# Patient Record
Sex: FEMALE | Race: WHITE
[De-identification: names, ages, dates, MRNs, and addresses within clinical notes are randomized per-mention and may not be internally consistent; named-entity substitution may affect disease eponyms.]

---

## 2017-06-28 ENCOUNTER — HOSPITAL ENCOUNTER (EMERGENCY)
Dept: HOSPITAL 10 - FTE | Age: 57
Discharge: HOME | End: 2017-06-28
Payer: COMMERCIAL

## 2017-06-28 VITALS — HEIGHT: 55 IN | BODY MASS INDEX: 31.12 KG/M2 | WEIGHT: 134.48 LBS
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
## 2017-06-28 DIAGNOSIS — S20.211A: Primary | ICD-10-CM

## 2017-06-28 DIAGNOSIS — F17.210: ICD-10-CM

## 2017-06-28 PROCEDURE — 71100 X-RAY EXAM RIBS UNI 2 VIEWS: CPT

## 2017-06-28 NOTE — ERD
ER Documentation


Chief Complaint


Date/Time


DATE: 6/28/17 


TIME: 13:39


Chief Complaint


sent by clinic for r. rib and r. shoulder pain s/p fall 1 wk ago





HPI


This is a [age]-year-old female who presents to the emergency room for 

evaluation of right-sided rib pain after she fell off a chair one week ago.  

The patient denies any head injury or loss of consciousness and does state that 

she is having pain in the right portion of her ribs.  She states it hurts when 

she takes a deep breath and when she moves her right upper extremity.  The 

patient denies any radiation of the pain.  She was seen at a clinic and was 

sent to the emergency room for evaluation.





ROS


All systems reviewed and are negative except as per history of present illness.





Medications


Home Meds


Active Scripts


Meloxicam* (Mobic*) 15 Mg Tablet, 15 MG PO DAILY, #30 TAB


   Prov:GAVIN VALDERRAMA PA-C         5/23/17


Reported Medications


[lamotrigine]   No Conflict Check


   7/19/16


[atarax]   No Conflict Check


   7/19/16


Lovastatin (Lovastatin) 40 Mg Tablet, 40 MG PO DAILY, TAB


   7/19/16


[levothyroxine]   No Conflict Check


   7/19/16


Alprazolam* (Xanax*) 2 Mg Tablet, 2 MG PO Q8H Y for ANXIETY, TAB


   7/19/16


Primidone* (Mysoline*) 250 Mg Tablet, 250 MG PO BID


   4/2/12





Allergies


Allergies:  


Coded Allergies:  


     Penicillins (Verified  Allergy, Unknown, RASH, 7/19/16)





PMhx/Soc


History of Surgery:  Yes (toyin ctr, [facility] [place] arthroscopy)


Anesthesia Reaction:  No


Hx Neurological Disorder:  Yes (seizures last sz over 10 years ago)


Hx Respiratory Disorders:  No


Hx Cardiac Disorders:  No


Hx Psychiatric Problems:  Yes (anxeity, depression)


Hx Miscellaneous Medical Probl:  Yes (DIFFICULTY SLEEPING, CARPAL TUNNER 

SYNDROME )


Hx Alcohol Use:  No


Hx Substance Use:  No


Hx Tobacco Use:  Yes


Smoking Status:  Current every day smoker





Physical Exam


Vitals





Vital Signs








  Date Time  Temp Pulse Resp B/P Pulse Ox O2 Delivery O2 Flow Rate FiO2


 


6/28/17 11:40 98.5 74 20 148/72 99   








Physical Exam


INITIAL VITAL SIGNS: Reviewed by me


GENERAL:  The patient is well developed and appropriate for usual state of 

health in no apparent distress


HEENT: Pupils equal, round, and reactive to light.  EOMI. There is no scleral 

icterus.


NECK:  C-spine is soft and supple, there is no meningismus.  There is no 

cervical lymphadenopathy.


LUNGS:  Clear to auscultation bilaterally. There are no rales, wheezes or 

rhonchi.


HEART:  Regular rate and rhythm, no murmurs, clicks, rubs or gallops.


ABDOMEN:  Soft, non-tender, non-distended.  There are bowel sounds in all four 

quadrants. No rebound or guarding.


EXTREMITIES:  There is no peripheral cyanosis or edema.  No focal swelling or 

erythema.


NEUROLOGICAL:  The patient moves all four extremities with 5/5 strength.  

Cranial nerves II - XII are intact. Normal gait. Alert and oriented


SKIN:  There is no apparent rash or petechiae.


Musculoskeletal: Tender to palpation of the right lateral chest wall, no 

paradoxical chest wall movement


HEME/LYMPHATIC:  There is no evidence of excessive bruising or lymphedema.


PSYCHIATRIC:  The patient does not appear anxious or depressed.





Procedures/MDM


X-ray Ribs 2V Interpreted by me:


Soft Tissue:                                               No acute 

abnormalities


Bones:                                                    No acute abnormalities


Mediastinum/Cardiac Silhouette/Lungs:     [No acute abnormalities]








This 56-year-old female presents to the ER for evaluation of right-sided rib 

pain after a fall from a chair.  She was sent on my examination.  No 

paradoxical chest wall movement.  X-rays were obtained which did not reveal any 

fractures.  The patient likely has a right-sided rib contusion.  The patient is 

seeing a pain management doctor and is on multiple pain medicines.  I did 

advise her to take a deep breaths and not to avoid developing pneumonia.  She 

verbalized understanding.  She will be discharged at this time into the care of 

her primary care physician.





Smoking Cessation Therapy:  Pt. was lectured for greater than  3 minutes on the 

health risks of continued smoking and the benefits of cessation.





Departure


Diagnosis:  


 Primary Impression:  


 Contusion of rib on right side


Condition:  Stable











BRISEIDA SANTANA DO Jun 28, 2017 13:42

## 2017-06-28 NOTE — RADRPT
PROCEDURE:   XR Ribs. 

 

CLINICAL INDICATION:   Chest pain  

 

TECHNIQUE:   Multiple oblique views of the right ribs, as well as a frontal chest, were obtained.  T
he images were reviewed on a PACS workstation. 

 

COMPARISON:   August 24, 2008 

 

FINDINGS:

There is no evidence for a rib fracture. The underlying lung parenchyma is intact without evidence f
or pneumothorax. 

The heart size is normal.  Lungs are clear.  The remainder of the osseous structures are unremarkabl
e.

There is no interval change.

 

IMPRESSION:

No acute rib fracture identified. Negative exam.  No interval change.

 

RPTAT: EE

_____________________________________________ 

.Sarah Guerrero MD, MD           Date    Time 

Electronically viewed and signed by .Sarah Guerrero MD, MD on 06/28/2017 13:37 

 

D:  06/28/2017 13:37  T:  06/28/2017 13:37

.F/

## 2018-02-10 ENCOUNTER — HOSPITAL ENCOUNTER (INPATIENT)
Age: 58
LOS: 8 days | Discharge: HOME | DRG: 102 | End: 2018-02-18

## 2018-02-10 ENCOUNTER — HOSPITAL ENCOUNTER (INPATIENT)
Dept: HOSPITAL 91 - MS4 | Age: 58
LOS: 8 days | Discharge: HOME | DRG: 102 | End: 2018-02-18
Payer: COMMERCIAL

## 2018-02-10 DIAGNOSIS — F17.210: ICD-10-CM

## 2018-02-10 DIAGNOSIS — E78.5: ICD-10-CM

## 2018-02-10 DIAGNOSIS — B95.5: ICD-10-CM

## 2018-02-10 DIAGNOSIS — G40.909: ICD-10-CM

## 2018-02-10 DIAGNOSIS — E03.9: ICD-10-CM

## 2018-02-10 DIAGNOSIS — R78.81: ICD-10-CM

## 2018-02-10 DIAGNOSIS — R55: ICD-10-CM

## 2018-02-10 DIAGNOSIS — Z91.81: ICD-10-CM

## 2018-02-10 DIAGNOSIS — F41.9: ICD-10-CM

## 2018-02-10 DIAGNOSIS — F32.9: ICD-10-CM

## 2018-02-10 DIAGNOSIS — G43.909: Primary | ICD-10-CM

## 2018-02-10 DIAGNOSIS — G20: ICD-10-CM

## 2018-02-10 DIAGNOSIS — I10: ICD-10-CM

## 2018-02-10 DIAGNOSIS — G93.40: ICD-10-CM

## 2018-02-10 LAB
ADD MAN DIFF?: NO
ADD UMIC: YES
ALANINE AMINOTRANSFERASE: 26 IU/L (ref 13–69)
ALBUMIN/GLOBULIN RATIO: 1.59
ALBUMIN: 4.3 G/DL (ref 3.3–4.9)
ALKALINE PHOSPHATASE: 87 IU/L (ref 42–121)
ANION GAP: 12 (ref 8–16)
ASPARTATE AMINO TRANSFERASE: 29 IU/L (ref 15–46)
BASOPHIL #: 0.1 10^3/UL (ref 0–0.1)
BASOPHILS %: 1 % (ref 0–2)
BILIRUBIN,DIRECT: 0 MG/DL (ref 0–0.2)
BILIRUBIN,TOTAL: 0 MG/DL (ref 0.2–1.3)
BLOOD UREA NITROGEN: 16 MG/DL (ref 7–20)
CALCIUM: 9.3 MG/DL (ref 8.4–10.2)
CARBON DIOXIDE: 24 MMOL/L (ref 21–31)
CHLORIDE: 102 MMOL/L (ref 97–110)
CK INDEX: 1
CK-MB: 0.96 NG/ML (ref 0–2.4)
CREATINE KINASE: 101 IU/L (ref 23–200)
CREATININE: 0.84 MG/DL (ref 0.44–1)
EOSINOPHILS #: 0.1 10^3/UL (ref 0–0.5)
EOSINOPHILS %: 2.3 % (ref 0–7)
FREE THYROXINE INDEX (CALC): 3.13 UG/ML (ref 0.65–3.89)
GLOBULIN: 2.7 G/DL (ref 1.3–3.2)
GLUCOSE: 82 MG/DL (ref 70–220)
HEMATOCRIT: 32.6 % (ref 37–47)
HEMOGLOBIN: 11.4 G/DL (ref 12–16)
INR: 0.91
LYMPHOCYTES #: 2.6 10^3/UL (ref 0.8–2.9)
LYMPHOCYTES %: 50.3 % (ref 15–51)
MEAN CORPUSCULAR HEMOGLOBIN: 30.9 PG (ref 29–33)
MEAN CORPUSCULAR HGB CONC: 35 G/DL (ref 32–37)
MEAN CORPUSCULAR VOLUME: 88.3 FL (ref 82–101)
MEAN PLATELET VOLUME: 10 FL (ref 7.4–10.4)
MONOCYTE #: 0.5 10^3/UL (ref 0.3–0.9)
MONOCYTES %: 9.4 % (ref 0–11)
NEUTROPHIL #: 1.9 10^3/UL (ref 1.6–7.5)
NEUTROPHILS %: 36.6 % (ref 39–77)
NUCLEATED RED BLOOD CELLS #: 0 10^3/UL (ref 0–0)
NUCLEATED RED BLOOD CELLS%: 0 /100WBC (ref 0–0)
PARTIAL THROMBOPLASTIN TIME: 27.3 SEC (ref 25–35)
PLATELET COUNT: 240 10^3/UL (ref 140–415)
POTASSIUM: 4.7 MMOL/L (ref 3.5–5.1)
PROTIME: 12.3 SEC (ref 11.9–14.9)
PT RATIO: 1
RED BLOOD COUNT: 3.69 10^6/UL (ref 4.2–5.4)
RED CELL DISTRIBUTION WIDTH: 14.3 % (ref 11.5–14.5)
SODIUM: 133 MMOL/L (ref 135–144)
T3 UPTAKE: 33.7 % (ref 23.5–40.5)
T4 (THYROXINE): 9.3 UG/DL (ref 5.5–11)
TOTAL PROTEIN: 7 G/DL (ref 6.1–8.1)
TROPONIN-I: < 0.012 NG/ML (ref 0–0.12)
UR ASCORBIC ACID: NEGATIVE MG/DL
UR BILIRUBIN (DIP): NEGATIVE MG/DL
UR BLOOD (DIP): (no result) MG/DL
UR CLARITY: CLEAR
UR COLOR: YELLOW
UR GLUCOSE (DIP): NEGATIVE MG/DL
UR KETONES (DIP): NEGATIVE MG/DL
UR LEUKOCYTE ESTERASE (DIP): NEGATIVE LEU/UL
UR NITRITE (DIP): NEGATIVE MG/DL
UR PH (DIP): 6 (ref 5–9)
UR RBC: 12 /HPF (ref 0–5)
UR SPECIFIC GRAVITY (DIP): 1.02 (ref 1–1.03)
UR TOTAL PROTEIN (DIP): NEGATIVE MG/DL
UR UROBILINOGEN (DIP): NEGATIVE MG/DL
UR WBC: 0 /HPF (ref 0–5)
WHITE BLOOD COUNT: 5.1 10^3/UL (ref 4.8–10.8)

## 2018-02-10 PROCEDURE — 70553 MRI BRAIN STEM W/O & W/DYE: CPT

## 2018-02-10 PROCEDURE — 85730 THROMBOPLASTIN TIME PARTIAL: CPT

## 2018-02-10 PROCEDURE — 85025 COMPLETE CBC W/AUTO DIFF WBC: CPT

## 2018-02-10 PROCEDURE — 84479 ASSAY OF THYROID (T3 OR T4): CPT

## 2018-02-10 PROCEDURE — 93005 ELECTROCARDIOGRAM TRACING: CPT

## 2018-02-10 PROCEDURE — 82550 ASSAY OF CK (CPK): CPT

## 2018-02-10 PROCEDURE — 80069 RENAL FUNCTION PANEL: CPT

## 2018-02-10 PROCEDURE — 36569 INSJ PICC 5 YR+ W/O IMAGING: CPT

## 2018-02-10 PROCEDURE — 83735 ASSAY OF MAGNESIUM: CPT

## 2018-02-10 PROCEDURE — 80202 ASSAY OF VANCOMYCIN: CPT

## 2018-02-10 PROCEDURE — 80306 DRUG TEST PRSMV INSTRMNT: CPT

## 2018-02-10 PROCEDURE — 80061 LIPID PANEL: CPT

## 2018-02-10 PROCEDURE — 97167 OT EVAL HIGH COMPLEX 60 MIN: CPT

## 2018-02-10 PROCEDURE — 76937 US GUIDE VASCULAR ACCESS: CPT

## 2018-02-10 PROCEDURE — 84132 ASSAY OF SERUM POTASSIUM: CPT

## 2018-02-10 PROCEDURE — 97163 PT EVAL HIGH COMPLEX 45 MIN: CPT

## 2018-02-10 PROCEDURE — 83036 HEMOGLOBIN GLYCOSYLATED A1C: CPT

## 2018-02-10 PROCEDURE — 80048 BASIC METABOLIC PNL TOTAL CA: CPT

## 2018-02-10 PROCEDURE — 84443 ASSAY THYROID STIM HORMONE: CPT

## 2018-02-10 PROCEDURE — 85610 PROTHROMBIN TIME: CPT

## 2018-02-10 PROCEDURE — 82553 CREATINE MB FRACTION: CPT

## 2018-02-10 PROCEDURE — 84436 ASSAY OF TOTAL THYROXINE: CPT

## 2018-02-10 PROCEDURE — 71045 X-RAY EXAM CHEST 1 VIEW: CPT

## 2018-02-10 PROCEDURE — 80053 COMPREHEN METABOLIC PANEL: CPT

## 2018-02-10 PROCEDURE — 70544 MR ANGIOGRAPHY HEAD W/O DYE: CPT

## 2018-02-10 PROCEDURE — 87040 BLOOD CULTURE FOR BACTERIA: CPT

## 2018-02-10 PROCEDURE — 84100 ASSAY OF PHOSPHORUS: CPT

## 2018-02-10 PROCEDURE — 95819 EEG AWAKE AND ASLEEP: CPT

## 2018-02-10 PROCEDURE — 80307 DRUG TEST PRSMV CHEM ANLYZR: CPT

## 2018-02-10 PROCEDURE — 82607 VITAMIN B-12: CPT

## 2018-02-10 PROCEDURE — 70549 MR ANGIOGRAPH NECK W/O&W/DYE: CPT

## 2018-02-10 PROCEDURE — 87086 URINE CULTURE/COLONY COUNT: CPT

## 2018-02-10 PROCEDURE — 72156 MRI NECK SPINE W/O & W/DYE: CPT

## 2018-02-10 PROCEDURE — 93306 TTE W/DOPPLER COMPLETE: CPT

## 2018-02-10 PROCEDURE — 82746 ASSAY OF FOLIC ACID SERUM: CPT

## 2018-02-10 PROCEDURE — 70450 CT HEAD/BRAIN W/O DYE: CPT

## 2018-02-10 PROCEDURE — 81001 URINALYSIS AUTO W/SCOPE: CPT

## 2018-02-10 PROCEDURE — 99285 EMERGENCY DEPT VISIT HI MDM: CPT

## 2018-02-10 PROCEDURE — 84484 ASSAY OF TROPONIN QUANT: CPT

## 2018-02-10 PROCEDURE — 84425 ASSAY OF VITAMIN B-1: CPT

## 2018-02-10 RX ADMIN — THIAMINE HYDROCHLORIDE 1 MLS/HR: 100 INJECTION, SOLUTION INTRAMUSCULAR; INTRAVENOUS at 22:39

## 2018-02-11 LAB
ACETAMINOPHEN: < 10 UG/ML (ref 10–30)
ADD MAN DIFF?: NO
ALANINE AMINOTRANSFERASE: 30 IU/L (ref 13–69)
ALBUMIN/GLOBULIN RATIO: 1.64
ALBUMIN: 4.1 G/DL (ref 3.3–4.9)
ALKALINE PHOSPHATASE: 78 IU/L (ref 42–121)
ANION GAP: 14 (ref 8–16)
ASPARTATE AMINO TRANSFERASE: 27 IU/L (ref 15–46)
BASOPHIL #: 0.1 10^3/UL (ref 0–0.1)
BASOPHILS %: 0.9 % (ref 0–2)
BILIRUBIN,DIRECT: 0 MG/DL (ref 0–0.2)
BILIRUBIN,TOTAL: 0.1 MG/DL (ref 0.2–1.3)
BLOOD UREA NITROGEN: 11 MG/DL (ref 7–20)
CALCIUM: 8.8 MG/DL (ref 8.4–10.2)
CARBON DIOXIDE: 24 MMOL/L (ref 21–31)
CHLORIDE: 107 MMOL/L (ref 97–110)
CHOL/HDL RATIO: 3 RATIO
CHOLESTEROL: 204 MG/DL (ref 100–200)
CREATININE: 0.76 MG/DL (ref 0.44–1)
EOSINOPHILS #: 0.1 10^3/UL (ref 0–0.5)
EOSINOPHILS %: 2.1 % (ref 0–7)
ETHANOL: < 10 MG/DL
FOLATE: 4.2 NG/ML (ref 2.8–20)
GLOBULIN: 2.5 G/DL (ref 1.3–3.2)
GLUCOSE: 81 MG/DL (ref 70–220)
HDL CHOLESTEROL: 67 MG/DL (ref 37–92)
HEMATOCRIT: 32.4 % (ref 37–47)
HEMOGLOBIN A1C: 5.2 % (ref 0–5.9)
HEMOGLOBIN: 11.2 G/DL (ref 12–16)
LDL CHOLESTEROL,CALCULATED: 120 MG/DL
LYMPHOCYTES #: 2.2 10^3/UL (ref 0.8–2.9)
LYMPHOCYTES %: 38.9 % (ref 15–51)
MAGNESIUM: 1.7 MG/DL (ref 1.7–2.5)
MEAN CORPUSCULAR HEMOGLOBIN: 30.7 PG (ref 29–33)
MEAN CORPUSCULAR HGB CONC: 34.6 G/DL (ref 32–37)
MEAN CORPUSCULAR VOLUME: 88.8 FL (ref 82–101)
MEAN PLATELET VOLUME: 9.9 FL (ref 7.4–10.4)
MONOCYTE #: 0.5 10^3/UL (ref 0.3–0.9)
MONOCYTES %: 9 % (ref 0–11)
NEUTROPHIL #: 2.8 10^3/UL (ref 1.6–7.5)
NEUTROPHILS %: 48.7 % (ref 39–77)
NUCLEATED RED BLOOD CELLS #: 0 10^3/UL (ref 0–0)
NUCLEATED RED BLOOD CELLS%: 0 /100WBC (ref 0–0)
PLATELET COUNT: 229 10^3/UL (ref 140–415)
POTASSIUM: 4.3 MMOL/L (ref 3.5–5.1)
RED BLOOD COUNT: 3.65 10^6/UL (ref 4.2–5.4)
RED CELL DISTRIBUTION WIDTH: 14.5 % (ref 11.5–14.5)
SALICYLATE: < 1 MG/DL (ref 5–30)
SODIUM: 141 MMOL/L (ref 135–144)
THYROID STIMULATING HORMONE: 0.93 MIU/L (ref 0.47–4.68)
TOTAL PROTEIN: 6.6 G/DL (ref 6.1–8.1)
TRIGLYCERIDES: 84 MG/DL (ref 0–149)
VITAMIN B12: 793 PG/ML (ref 239–931)
WHITE BLOOD COUNT: 5.7 10^3/UL (ref 4.8–10.8)

## 2018-02-11 RX ADMIN — MAGNESIUM SULFATE HEPTAHYDRATE 1 MLS/HR: 40 INJECTION, SOLUTION INTRAVENOUS at 14:53

## 2018-02-11 RX ADMIN — KETOROLAC TROMETHAMINE 1 MG: 30 INJECTION, SOLUTION INTRAMUSCULAR at 18:20

## 2018-02-11 RX ADMIN — SUMATRIPTAN SUCCINATE 1 MG: 6 INJECTION SUBCUTANEOUS at 10:11

## 2018-02-11 RX ADMIN — PRIMIDONE 1 MG: 250 TABLET ORAL at 10:11

## 2018-02-11 RX ADMIN — PROCHLORPERAZINE MALEATE 1 MG: 10 TABLET, FILM COATED ORAL at 15:51

## 2018-02-11 RX ADMIN — ALPRAZOLAM 1 MG: 1 TABLET ORAL at 21:39

## 2018-02-11 RX ADMIN — DIPHENHYDRAMINE HYDROCHLORIDE 1 MG: 50 INJECTION, SOLUTION INTRAMUSCULAR; INTRAVENOUS at 15:51

## 2018-02-11 RX ADMIN — PRIMIDONE 1 MG: 250 TABLET ORAL at 20:55

## 2018-02-12 LAB
ADD MAN DIFF?: NO
ALBUMIN: 3.7 G/DL (ref 3.3–4.9)
ANION GAP: 10 (ref 8–16)
BASOPHIL #: 0.1 10^3/UL (ref 0–0.1)
BASOPHILS %: 1 % (ref 0–2)
BLOOD UREA NITROGEN: 13 MG/DL (ref 7–20)
CALCIUM: 9.1 MG/DL (ref 8.4–10.2)
CARBON DIOXIDE: 24 MMOL/L (ref 21–31)
CHLORIDE: 106 MMOL/L (ref 97–110)
CREATININE: 0.8 MG/DL (ref 0.44–1)
EOSINOPHILS #: 0.1 10^3/UL (ref 0–0.5)
EOSINOPHILS %: 2.8 % (ref 0–7)
GLUCOSE: 89 MG/DL (ref 70–220)
HEMATOCRIT: 32.9 % (ref 37–47)
HEMOGLOBIN: 11.3 G/DL (ref 12–16)
LYMPHOCYTES #: 2.4 10^3/UL (ref 0.8–2.9)
LYMPHOCYTES %: 48.3 % (ref 15–51)
MAGNESIUM: 1.8 MG/DL (ref 1.7–2.5)
MEAN CORPUSCULAR HEMOGLOBIN: 30.5 PG (ref 29–33)
MEAN CORPUSCULAR HGB CONC: 34.3 G/DL (ref 32–37)
MEAN CORPUSCULAR VOLUME: 88.7 FL (ref 82–101)
MEAN PLATELET VOLUME: 10.1 FL (ref 7.4–10.4)
MONOCYTE #: 0.6 10^3/UL (ref 0.3–0.9)
MONOCYTES %: 11.4 % (ref 0–11)
NEUTROPHIL #: 1.8 10^3/UL (ref 1.6–7.5)
NEUTROPHILS %: 36.3 % (ref 39–77)
NUCLEATED RED BLOOD CELLS #: 0 10^3/UL (ref 0–0)
NUCLEATED RED BLOOD CELLS%: 0 /100WBC (ref 0–0)
PHOSPHORUS: 3.6 MG/DL (ref 2.5–4.9)
PLATELET COUNT: 209 10^3/UL (ref 140–415)
POTASSIUM: 4.5 MMOL/L (ref 3.5–5.1)
POTASSIUM: 5.3 MMOL/L (ref 3.5–5.1)
RED BLOOD COUNT: 3.71 10^6/UL (ref 4.2–5.4)
RED CELL DISTRIBUTION WIDTH: 14.5 % (ref 11.5–14.5)
SODIUM: 135 MMOL/L (ref 135–144)
WHITE BLOOD COUNT: 5 10^3/UL (ref 4.8–10.8)

## 2018-02-12 RX ADMIN — ALPRAZOLAM 1 MG: 1 TABLET ORAL at 13:27

## 2018-02-12 RX ADMIN — KETOROLAC TROMETHAMINE 1 MG: 30 INJECTION, SOLUTION INTRAMUSCULAR at 08:21

## 2018-02-12 RX ADMIN — PRIMIDONE 1 MG: 250 TABLET ORAL at 08:21

## 2018-02-12 RX ADMIN — TOPIRAMATE 1 MG: 25 TABLET, FILM COATED ORAL at 21:37

## 2018-02-12 RX ADMIN — KETOROLAC TROMETHAMINE 1 MG: 30 INJECTION, SOLUTION INTRAMUSCULAR at 19:08

## 2018-02-12 RX ADMIN — TOPIRAMATE 1 MG: 25 TABLET, FILM COATED ORAL at 13:27

## 2018-02-12 RX ADMIN — PRIMIDONE 1 MG: 250 TABLET ORAL at 21:37

## 2018-02-12 RX ADMIN — LORAZEPAM 1 MG: 2 INJECTION, SOLUTION INTRAMUSCULAR; INTRAVENOUS at 16:13

## 2018-02-12 RX ADMIN — VANCOMYCIN HYDROCHLORIDE 1 MLS/HR: 1 INJECTION, POWDER, LYOPHILIZED, FOR SOLUTION INTRAVENOUS at 13:28

## 2018-02-12 RX ADMIN — ALPRAZOLAM 1 MG: 1 TABLET ORAL at 21:50

## 2018-02-13 LAB
ADD MAN DIFF?: NO
ANION GAP: 10 (ref 8–16)
BASOPHIL #: 0.1 10^3/UL (ref 0–0.1)
BASOPHILS %: 1.2 % (ref 0–2)
BLOOD UREA NITROGEN: 9 MG/DL (ref 7–20)
CALCIUM: 8.6 MG/DL (ref 8.4–10.2)
CARBON DIOXIDE: 27 MMOL/L (ref 21–31)
CHLORIDE: 106 MMOL/L (ref 97–110)
CREATININE: 0.83 MG/DL (ref 0.44–1)
EOSINOPHILS #: 0.2 10^3/UL (ref 0–0.5)
EOSINOPHILS %: 3.1 % (ref 0–7)
GLUCOSE: 88 MG/DL (ref 70–220)
HEMATOCRIT: 31.6 % (ref 37–47)
HEMOGLOBIN: 10.9 G/DL (ref 12–16)
LYMPHOCYTES #: 2.2 10^3/UL (ref 0.8–2.9)
LYMPHOCYTES %: 42.3 % (ref 15–51)
MAGNESIUM: 1.6 MG/DL (ref 1.7–2.5)
MEAN CORPUSCULAR HEMOGLOBIN: 30.7 PG (ref 29–33)
MEAN CORPUSCULAR HGB CONC: 34.5 G/DL (ref 32–37)
MEAN CORPUSCULAR VOLUME: 89 FL (ref 82–101)
MEAN PLATELET VOLUME: 10.2 FL (ref 7.4–10.4)
MONOCYTE #: 0.5 10^3/UL (ref 0.3–0.9)
MONOCYTES %: 10.5 % (ref 0–11)
NEUTROPHIL #: 2.2 10^3/UL (ref 1.6–7.5)
NEUTROPHILS %: 42.7 % (ref 39–77)
NUCLEATED RED BLOOD CELLS #: 0 10^3/UL (ref 0–0)
NUCLEATED RED BLOOD CELLS%: 0 /100WBC (ref 0–0)
PHOSPHORUS: 4.1 MG/DL (ref 2.5–4.9)
PLATELET COUNT: 214 10^3/UL (ref 140–415)
POTASSIUM: 5.2 MMOL/L (ref 3.5–5.1)
RED BLOOD COUNT: 3.55 10^6/UL (ref 4.2–5.4)
RED CELL DISTRIBUTION WIDTH: 14.5 % (ref 11.5–14.5)
SODIUM: 138 MMOL/L (ref 135–144)
VANCOMYCIN,TROUGH: 14.3 UG/ML (ref 10–20)
WHITE BLOOD COUNT: 5.1 10^3/UL (ref 4.8–10.8)

## 2018-02-13 RX ADMIN — KETOROLAC TROMETHAMINE 1 MG: 30 INJECTION, SOLUTION INTRAMUSCULAR at 05:43

## 2018-02-13 RX ADMIN — TOPIRAMATE 1 MG: 25 TABLET, FILM COATED ORAL at 09:06

## 2018-02-13 RX ADMIN — KETOROLAC TROMETHAMINE 1 MG: 30 INJECTION, SOLUTION INTRAMUSCULAR at 13:18

## 2018-02-13 RX ADMIN — PRIMIDONE 1 MG: 250 TABLET ORAL at 09:06

## 2018-02-13 RX ADMIN — PRIMIDONE 1 MG: 250 TABLET ORAL at 21:31

## 2018-02-13 RX ADMIN — VANCOMYCIN HYDROCHLORIDE 1 MLS/HR: 500 INJECTION, POWDER, LYOPHILIZED, FOR SOLUTION INTRAVENOUS at 13:17

## 2018-02-13 RX ADMIN — ALPRAZOLAM 1 MG: 1 TABLET ORAL at 21:42

## 2018-02-13 RX ADMIN — ALPRAZOLAM 1 MG: 1 TABLET ORAL at 09:08

## 2018-02-13 RX ADMIN — VANCOMYCIN HYDROCHLORIDE 1 MLS/HR: 500 INJECTION, POWDER, LYOPHILIZED, FOR SOLUTION INTRAVENOUS at 00:26

## 2018-02-14 LAB
ADD MAN DIFF?: NO
ANION GAP: 11 (ref 8–16)
BASOPHIL #: 0 10^3/UL (ref 0–0.1)
BASOPHILS %: 0.9 % (ref 0–2)
BLOOD UREA NITROGEN: 8 MG/DL (ref 7–20)
CALCIUM: 9.2 MG/DL (ref 8.4–10.2)
CARBON DIOXIDE: 24 MMOL/L (ref 21–31)
CHLORIDE: 106 MMOL/L (ref 97–110)
CREATININE: 0.73 MG/DL (ref 0.44–1)
EOSINOPHILS #: 0.2 10^3/UL (ref 0–0.5)
EOSINOPHILS %: 3.4 % (ref 0–7)
GLUCOSE: 89 MG/DL (ref 70–220)
HEMATOCRIT: 32.8 % (ref 37–47)
HEMOGLOBIN: 11.4 G/DL (ref 12–16)
LYMPHOCYTES #: 1.8 10^3/UL (ref 0.8–2.9)
LYMPHOCYTES %: 40.4 % (ref 15–51)
MAGNESIUM: 1.7 MG/DL (ref 1.7–2.5)
MEAN CORPUSCULAR HEMOGLOBIN: 30.3 PG (ref 29–33)
MEAN CORPUSCULAR HGB CONC: 34.8 G/DL (ref 32–37)
MEAN CORPUSCULAR VOLUME: 87.2 FL (ref 82–101)
MEAN PLATELET VOLUME: 10.3 FL (ref 7.4–10.4)
MONOCYTE #: 0.4 10^3/UL (ref 0.3–0.9)
MONOCYTES %: 9.4 % (ref 0–11)
NEUTROPHIL #: 2 10^3/UL (ref 1.6–7.5)
NEUTROPHILS %: 45.7 % (ref 39–77)
NUCLEATED RED BLOOD CELLS #: 0 10^3/UL (ref 0–0)
NUCLEATED RED BLOOD CELLS%: 0 /100WBC (ref 0–0)
PHOSPHORUS: 4.2 MG/DL (ref 2.5–4.9)
PLATELET COUNT: 221 10^3/UL (ref 140–415)
POTASSIUM: 4.2 MMOL/L (ref 3.5–5.1)
RED BLOOD COUNT: 3.76 10^6/UL (ref 4.2–5.4)
RED CELL DISTRIBUTION WIDTH: 14.5 % (ref 11.5–14.5)
SODIUM: 137 MMOL/L (ref 135–144)
WHITE BLOOD COUNT: 4.5 10^3/UL (ref 4.8–10.8)

## 2018-02-14 RX ADMIN — LAMOTRIGINE 1 MG: 100 TABLET ORAL at 10:13

## 2018-02-14 RX ADMIN — PRIMIDONE 1 MG: 250 TABLET ORAL at 21:24

## 2018-02-14 RX ADMIN — PROCHLORPERAZINE MALEATE 1 MG: 10 TABLET, FILM COATED ORAL at 08:36

## 2018-02-14 RX ADMIN — KETOROLAC TROMETHAMINE 1 MG: 30 INJECTION, SOLUTION INTRAMUSCULAR at 10:11

## 2018-02-14 RX ADMIN — ALPRAZOLAM 1 MG: 1 TABLET ORAL at 21:25

## 2018-02-14 RX ADMIN — MORPHINE SULFATE 1 MG: 2 INJECTION, SOLUTION INTRAMUSCULAR; INTRAVENOUS at 17:45

## 2018-02-14 RX ADMIN — PRIMIDONE 1 MG: 250 TABLET ORAL at 08:30

## 2018-02-14 RX ADMIN — ALPRAZOLAM 1 MG: 1 TABLET ORAL at 10:17

## 2018-02-14 RX ADMIN — VANCOMYCIN HYDROCHLORIDE 1 MLS/HR: 500 INJECTION, POWDER, LYOPHILIZED, FOR SOLUTION INTRAVENOUS at 00:52

## 2018-02-14 RX ADMIN — VANCOMYCIN HYDROCHLORIDE 1 MLS/HR: 500 INJECTION, POWDER, LYOPHILIZED, FOR SOLUTION INTRAVENOUS at 12:35

## 2018-02-14 RX ADMIN — MORPHINE SULFATE 1 MG: 2 INJECTION, SOLUTION INTRAMUSCULAR; INTRAVENOUS at 22:51

## 2018-02-14 RX ADMIN — BUTALBITAL, ACETAMINOPHEN, AND CAFFEINE 1 TAB: 50; 325; 40 TABLET ORAL at 15:54

## 2018-02-14 RX ADMIN — LAMOTRIGINE 1 MG: 100 TABLET ORAL at 21:24

## 2018-02-15 LAB
ADD MAN DIFF?: NO
ANION GAP: 9 (ref 8–16)
BASOPHIL #: 0.1 10^3/UL (ref 0–0.1)
BASOPHILS %: 1.3 % (ref 0–2)
BLOOD UREA NITROGEN: 9 MG/DL (ref 7–20)
CALCIUM: 8.5 MG/DL (ref 8.4–10.2)
CARBON DIOXIDE: 25 MMOL/L (ref 21–31)
CHLORIDE: 103 MMOL/L (ref 97–110)
CREATININE: 0.82 MG/DL (ref 0.44–1)
EOSINOPHILS #: 0.1 10^3/UL (ref 0–0.5)
EOSINOPHILS %: 2.7 % (ref 0–7)
GLUCOSE: 83 MG/DL (ref 70–220)
HEMATOCRIT: 31.2 % (ref 37–47)
HEMOGLOBIN: 10.8 G/DL (ref 12–16)
LYMPHOCYTES #: 2.4 10^3/UL (ref 0.8–2.9)
LYMPHOCYTES %: 50.3 % (ref 15–51)
MAGNESIUM: 1.6 MG/DL (ref 1.7–2.5)
MEAN CORPUSCULAR HEMOGLOBIN: 30.4 PG (ref 29–33)
MEAN CORPUSCULAR HGB CONC: 34.6 G/DL (ref 32–37)
MEAN CORPUSCULAR VOLUME: 87.9 FL (ref 82–101)
MEAN PLATELET VOLUME: 10.4 FL (ref 7.4–10.4)
MONOCYTE #: 0.5 10^3/UL (ref 0.3–0.9)
MONOCYTES %: 10.7 % (ref 0–11)
NEUTROPHIL #: 1.7 10^3/UL (ref 1.6–7.5)
NEUTROPHILS %: 34.6 % (ref 39–77)
NUCLEATED RED BLOOD CELLS #: 0 10^3/UL (ref 0–0)
NUCLEATED RED BLOOD CELLS%: 0 /100WBC (ref 0–0)
PHOSPHORUS: 4.1 MG/DL (ref 2.5–4.9)
PLATELET COUNT: 213 10^3/UL (ref 140–415)
POTASSIUM: 4.2 MMOL/L (ref 3.5–5.1)
RED BLOOD COUNT: 3.55 10^6/UL (ref 4.2–5.4)
RED CELL DISTRIBUTION WIDTH: 14.6 % (ref 11.5–14.5)
SODIUM: 133 MMOL/L (ref 135–144)
VITAMIN B1 (THIAMINE): 99 NMOL/L (ref 78–185)
WHITE BLOOD COUNT: 4.8 10^3/UL (ref 4.8–10.8)

## 2018-02-15 RX ADMIN — MORPHINE SULFATE 1 MG: 2 INJECTION, SOLUTION INTRAMUSCULAR; INTRAVENOUS at 17:16

## 2018-02-15 RX ADMIN — VANCOMYCIN HYDROCHLORIDE 1 MLS/HR: 500 INJECTION, POWDER, LYOPHILIZED, FOR SOLUTION INTRAVENOUS at 00:18

## 2018-02-15 RX ADMIN — MORPHINE SULFATE 1 MG: 2 INJECTION, SOLUTION INTRAMUSCULAR; INTRAVENOUS at 20:56

## 2018-02-15 RX ADMIN — MAGNESIUM OXIDE TAB 400 MG (241.3 MG ELEMENTAL MG) 1 MG: 400 (241.3 MG) TAB at 14:04

## 2018-02-15 RX ADMIN — MORPHINE SULFATE 1 MG: 2 INJECTION, SOLUTION INTRAMUSCULAR; INTRAVENOUS at 12:36

## 2018-02-15 RX ADMIN — PRIMIDONE 1 MG: 250 TABLET ORAL at 20:57

## 2018-02-15 RX ADMIN — ALPRAZOLAM 1 MG: 1 TABLET ORAL at 08:25

## 2018-02-15 RX ADMIN — LAMOTRIGINE 1 MG: 100 TABLET ORAL at 08:23

## 2018-02-15 RX ADMIN — VANCOMYCIN HYDROCHLORIDE 1 MLS/HR: 500 INJECTION, POWDER, LYOPHILIZED, FOR SOLUTION INTRAVENOUS at 12:44

## 2018-02-15 RX ADMIN — PRIMIDONE 1 MG: 250 TABLET ORAL at 08:23

## 2018-02-15 RX ADMIN — LAMOTRIGINE 1 MG: 100 TABLET ORAL at 20:57

## 2018-02-15 RX ADMIN — MORPHINE SULFATE 1 MG: 2 INJECTION, SOLUTION INTRAMUSCULAR; INTRAVENOUS at 06:44

## 2018-02-15 RX ADMIN — CYCLOBENZAPRINE 1 MG: 10 TABLET, FILM COATED ORAL at 20:56

## 2018-02-15 RX ADMIN — ALPRAZOLAM 1 MG: 1 TABLET ORAL at 20:56

## 2018-02-16 LAB
ADD MAN DIFF?: NO
ALBUMIN: 3.9 G/DL (ref 3.3–4.9)
ANION GAP: 10 (ref 8–16)
BASOPHIL #: 0.1 10^3/UL (ref 0–0.1)
BASOPHILS %: 1.7 % (ref 0–2)
BLOOD UREA NITROGEN: 12 MG/DL (ref 7–20)
CALCIUM: 9.1 MG/DL (ref 8.4–10.2)
CARBON DIOXIDE: 26 MMOL/L (ref 21–31)
CHLORIDE: 104 MMOL/L (ref 97–110)
CREATININE: 0.83 MG/DL (ref 0.44–1)
EOSINOPHILS #: 0.2 10^3/UL (ref 0–0.5)
EOSINOPHILS %: 3.2 % (ref 0–7)
GLUCOSE: 81 MG/DL (ref 70–220)
HEMATOCRIT: 34.3 % (ref 37–47)
HEMOGLOBIN: 11.9 G/DL (ref 12–16)
LYMPHOCYTES #: 2.8 10^3/UL (ref 0.8–2.9)
LYMPHOCYTES %: 52 % (ref 15–51)
MAGNESIUM: 1.9 MG/DL (ref 1.7–2.5)
MEAN CORPUSCULAR HEMOGLOBIN: 30.9 PG (ref 29–33)
MEAN CORPUSCULAR HGB CONC: 34.7 G/DL (ref 32–37)
MEAN CORPUSCULAR VOLUME: 89.1 FL (ref 82–101)
MEAN PLATELET VOLUME: 10.2 FL (ref 7.4–10.4)
MONOCYTE #: 0.5 10^3/UL (ref 0.3–0.9)
MONOCYTES %: 10.1 % (ref 0–11)
NEUTROPHIL #: 1.8 10^3/UL (ref 1.6–7.5)
NEUTROPHILS %: 32.6 % (ref 39–77)
NUCLEATED RED BLOOD CELLS #: 0 10^3/UL (ref 0–0)
NUCLEATED RED BLOOD CELLS%: 0 /100WBC (ref 0–0)
PHOSPHORUS: 4.3 MG/DL (ref 2.5–4.9)
PLATELET COUNT: 227 10^3/UL (ref 140–415)
POTASSIUM: 5.2 MMOL/L (ref 3.5–5.1)
RED BLOOD COUNT: 3.85 10^6/UL (ref 4.2–5.4)
RED CELL DISTRIBUTION WIDTH: 14.8 % (ref 11.5–14.5)
SODIUM: 135 MMOL/L (ref 135–144)
VANCOMYCIN,TROUGH: 14.7 UG/ML (ref 10–20)
WHITE BLOOD COUNT: 5.4 10^3/UL (ref 4.8–10.8)

## 2018-02-16 RX ADMIN — MORPHINE SULFATE 1 MG: 2 INJECTION, SOLUTION INTRAMUSCULAR; INTRAVENOUS at 20:17

## 2018-02-16 RX ADMIN — MORPHINE SULFATE 1 MG: 2 INJECTION, SOLUTION INTRAMUSCULAR; INTRAVENOUS at 11:46

## 2018-02-16 RX ADMIN — VANCOMYCIN HYDROCHLORIDE 1 MLS/HR: 500 INJECTION, POWDER, LYOPHILIZED, FOR SOLUTION INTRAVENOUS at 00:39

## 2018-02-16 RX ADMIN — PRIMIDONE 1 MG: 250 TABLET ORAL at 08:22

## 2018-02-16 RX ADMIN — VANCOMYCIN HYDROCHLORIDE 1 MLS/HR: 500 INJECTION, POWDER, LYOPHILIZED, FOR SOLUTION INTRAVENOUS at 11:46

## 2018-02-16 RX ADMIN — MORPHINE SULFATE 1 MG: 2 INJECTION, SOLUTION INTRAMUSCULAR; INTRAVENOUS at 04:54

## 2018-02-16 RX ADMIN — ALPRAZOLAM 1 MG: 1 TABLET ORAL at 10:05

## 2018-02-16 RX ADMIN — PRIMIDONE 1 MG: 250 TABLET ORAL at 21:23

## 2018-02-16 RX ADMIN — MORPHINE SULFATE 1 MG: 2 INJECTION, SOLUTION INTRAMUSCULAR; INTRAVENOUS at 08:22

## 2018-02-16 RX ADMIN — LAMOTRIGINE 1 MG: 100 TABLET ORAL at 08:22

## 2018-02-16 RX ADMIN — LAMOTRIGINE 1 MG: 100 TABLET ORAL at 21:23

## 2018-02-16 RX ADMIN — MORPHINE SULFATE 1 MG: 2 INJECTION, SOLUTION INTRAMUSCULAR; INTRAVENOUS at 17:02

## 2018-02-16 RX ADMIN — ALPRAZOLAM 1 MG: 1 TABLET ORAL at 23:16

## 2018-02-17 LAB
ADD MAN DIFF?: NO
ANION GAP: 10 (ref 8–16)
BASOPHIL #: 0.1 10^3/UL (ref 0–0.1)
BASOPHILS %: 1.1 % (ref 0–2)
BLOOD UREA NITROGEN: 15 MG/DL (ref 7–20)
CALCIUM: 8.9 MG/DL (ref 8.4–10.2)
CARBON DIOXIDE: 25 MMOL/L (ref 21–31)
CHLORIDE: 101 MMOL/L (ref 97–110)
CREATININE: 0.97 MG/DL (ref 0.44–1)
EOSINOPHILS #: 0.2 10^3/UL (ref 0–0.5)
EOSINOPHILS %: 3.1 % (ref 0–7)
GLUCOSE: 71 MG/DL (ref 70–220)
HEMATOCRIT: 30.5 % (ref 37–47)
HEMOGLOBIN: 10.5 G/DL (ref 12–16)
LYMPHOCYTES #: 2.6 10^3/UL (ref 0.8–2.9)
LYMPHOCYTES %: 49 % (ref 15–51)
MAGNESIUM: 1.7 MG/DL (ref 1.7–2.5)
MEAN CORPUSCULAR HEMOGLOBIN: 30.9 PG (ref 29–33)
MEAN CORPUSCULAR HGB CONC: 34.4 G/DL (ref 32–37)
MEAN CORPUSCULAR VOLUME: 89.7 FL (ref 82–101)
MEAN PLATELET VOLUME: 10.4 FL (ref 7.4–10.4)
MONOCYTE #: 0.7 10^3/UL (ref 0.3–0.9)
MONOCYTES %: 12.5 % (ref 0–11)
NEUTROPHIL #: 1.8 10^3/UL (ref 1.6–7.5)
NEUTROPHILS %: 34.1 % (ref 39–77)
NUCLEATED RED BLOOD CELLS #: 0 10^3/UL (ref 0–0)
NUCLEATED RED BLOOD CELLS%: 0 /100WBC (ref 0–0)
PHOSPHORUS: 4.8 MG/DL (ref 2.5–4.9)
PLATELET COUNT: 195 10^3/UL (ref 140–415)
POTASSIUM: 4.4 MMOL/L (ref 3.5–5.1)
RED BLOOD COUNT: 3.4 10^6/UL (ref 4.2–5.4)
RED CELL DISTRIBUTION WIDTH: 15 % (ref 11.5–14.5)
SODIUM: 132 MMOL/L (ref 135–144)
WHITE BLOOD COUNT: 5.2 10^3/UL (ref 4.8–10.8)

## 2018-02-17 PROCEDURE — B548ZZA ULTRASONOGRAPHY OF SUPERIOR VENA CAVA, GUIDANCE: ICD-10-PCS

## 2018-02-17 PROCEDURE — 02HV33Z INSERTION OF INFUSION DEVICE INTO SUPERIOR VENA CAVA, PERCUTANEOUS APPROACH: ICD-10-PCS

## 2018-02-17 RX ADMIN — LAMOTRIGINE 1 MG: 100 TABLET ORAL at 20:24

## 2018-02-17 RX ADMIN — LAMOTRIGINE 1 MG: 100 TABLET ORAL at 09:06

## 2018-02-17 RX ADMIN — VASOPRESSIN 1 ML/HR: 20 INJECTION, SOLUTION INTRAMUSCULAR; SUBCUTANEOUS at 13:20

## 2018-02-17 RX ADMIN — PRIMIDONE 1 MG: 250 TABLET ORAL at 20:24

## 2018-02-17 RX ADMIN — MORPHINE SULFATE 1 MG: 2 INJECTION, SOLUTION INTRAMUSCULAR; INTRAVENOUS at 14:25

## 2018-02-17 RX ADMIN — MORPHINE SULFATE 1 MG: 2 INJECTION, SOLUTION INTRAMUSCULAR; INTRAVENOUS at 08:57

## 2018-02-17 RX ADMIN — PRIMIDONE 1 MG: 250 TABLET ORAL at 08:57

## 2018-02-17 RX ADMIN — ALPRAZOLAM 1 MG: 1 TABLET ORAL at 22:16

## 2018-02-17 RX ADMIN — VANCOMYCIN HYDROCHLORIDE 1 MLS/HR: 500 INJECTION, POWDER, LYOPHILIZED, FOR SOLUTION INTRAVENOUS at 11:48

## 2018-02-17 RX ADMIN — VANCOMYCIN HYDROCHLORIDE 1 MLS/HR: 500 INJECTION, POWDER, LYOPHILIZED, FOR SOLUTION INTRAVENOUS at 00:29

## 2018-02-17 RX ADMIN — MORPHINE SULFATE 1 MG: 2 INJECTION, SOLUTION INTRAMUSCULAR; INTRAVENOUS at 20:25

## 2018-02-17 RX ADMIN — LIDOCAINE HYDROCHLORIDE 1 ML: 10 INJECTION, SOLUTION EPIDURAL; INFILTRATION; INTRACAUDAL; PERINEURAL at 13:20

## 2018-02-17 RX ADMIN — VANCOMYCIN HYDROCHLORIDE 1 MLS/HR: 500 INJECTION, POWDER, LYOPHILIZED, FOR SOLUTION INTRAVENOUS at 23:52

## 2018-02-17 RX ADMIN — MORPHINE SULFATE 1 MG: 2 INJECTION, SOLUTION INTRAMUSCULAR; INTRAVENOUS at 00:30

## 2018-02-17 RX ADMIN — ALPRAZOLAM 1 MG: 1 TABLET ORAL at 08:57

## 2018-02-17 RX ADMIN — MORPHINE SULFATE 1 MG: 2 INJECTION, SOLUTION INTRAMUSCULAR; INTRAVENOUS at 03:34

## 2018-02-18 RX ADMIN — ALPRAZOLAM 1 MG: 0.5 TABLET ORAL at 06:50

## 2018-02-18 RX ADMIN — MORPHINE SULFATE 1 MG: 2 INJECTION, SOLUTION INTRAMUSCULAR; INTRAVENOUS at 03:55

## 2018-02-18 RX ADMIN — MORPHINE SULFATE 1 MG: 2 INJECTION, SOLUTION INTRAMUSCULAR; INTRAVENOUS at 00:07

## 2018-02-18 RX ADMIN — BISACODYL 1 MG: 5 TABLET, COATED ORAL at 04:51

## 2018-02-18 RX ADMIN — LAMOTRIGINE 1 MG: 100 TABLET ORAL at 09:10

## 2018-02-18 RX ADMIN — MORPHINE SULFATE 1 MG: 2 INJECTION, SOLUTION INTRAMUSCULAR; INTRAVENOUS at 09:05

## 2018-02-18 RX ADMIN — PRIMIDONE 1 MG: 250 TABLET ORAL at 09:10

## 2018-05-31 ENCOUNTER — HOSPITAL ENCOUNTER (EMERGENCY)
Age: 58
LOS: 1 days | Discharge: HOME | End: 2018-06-01

## 2018-05-31 ENCOUNTER — HOSPITAL ENCOUNTER (EMERGENCY)
Dept: HOSPITAL 91 - FTE | Age: 58
LOS: 1 days | Discharge: HOME | End: 2018-06-01
Payer: COMMERCIAL

## 2018-05-31 DIAGNOSIS — R55: ICD-10-CM

## 2018-05-31 DIAGNOSIS — I10: ICD-10-CM

## 2018-05-31 DIAGNOSIS — F17.210: ICD-10-CM

## 2018-05-31 DIAGNOSIS — R51: Primary | ICD-10-CM

## 2018-05-31 LAB
ADD MAN DIFF?: NO
ANION GAP: 12 (ref 8–16)
BASOPHIL #: 0.1 10^3/UL (ref 0–0.1)
BASOPHILS %: 1 % (ref 0–2)
BLOOD UREA NITROGEN: 15 MG/DL (ref 7–20)
CALCIUM: 9.6 MG/DL (ref 8.4–10.2)
CARBON DIOXIDE: 25 MMOL/L (ref 21–31)
CHLORIDE: 105 MMOL/L (ref 97–110)
CREATININE: 0.92 MG/DL (ref 0.44–1)
EOSINOPHILS #: 0.1 10^3/UL (ref 0–0.5)
EOSINOPHILS %: 1.8 % (ref 0–7)
GLUCOSE: 84 MG/DL (ref 70–220)
HEMATOCRIT: 37.6 % (ref 37–47)
HEMOGLOBIN: 12.9 G/DL (ref 12–16)
INR: 0.91
LYMPHOCYTES #: 3.5 10^3/UL (ref 0.8–2.9)
LYMPHOCYTES %: 57 % (ref 15–51)
MEAN CORPUSCULAR HEMOGLOBIN: 30.5 PG (ref 29–33)
MEAN CORPUSCULAR HGB CONC: 34.3 G/DL (ref 32–37)
MEAN CORPUSCULAR VOLUME: 88.9 FL (ref 82–101)
MEAN PLATELET VOLUME: 10.3 FL (ref 7.4–10.4)
MONOCYTE #: 0.6 10^3/UL (ref 0.3–0.9)
MONOCYTES %: 9.8 % (ref 0–11)
NEUTROPHIL #: 1.9 10^3/UL (ref 1.6–7.5)
NEUTROPHILS %: 30.1 % (ref 39–77)
NUCLEATED RED BLOOD CELLS #: 0 10^3/UL (ref 0–0)
NUCLEATED RED BLOOD CELLS%: 0 /100WBC (ref 0–0)
PARTIAL THROMBOPLASTIN TIME: 25.5 SEC (ref 25–35)
PLATELET COUNT: 253 10^3/UL (ref 140–415)
POTASSIUM: 4.4 MMOL/L (ref 3.5–5.1)
PROTIME: 12.3 SEC (ref 11.9–14.9)
PT RATIO: 1
RED BLOOD COUNT: 4.23 10^6/UL (ref 4.2–5.4)
RED CELL DISTRIBUTION WIDTH: 14.3 % (ref 11.5–14.5)
SODIUM: 138 MMOL/L (ref 135–144)
WHITE BLOOD COUNT: 6.2 10^3/UL (ref 4.8–10.8)

## 2018-05-31 PROCEDURE — 80048 BASIC METABOLIC PNL TOTAL CA: CPT

## 2018-05-31 PROCEDURE — 96374 THER/PROPH/DIAG INJ IV PUSH: CPT

## 2018-05-31 PROCEDURE — 85610 PROTHROMBIN TIME: CPT

## 2018-05-31 PROCEDURE — 70496 CT ANGIOGRAPHY HEAD: CPT

## 2018-05-31 PROCEDURE — 85025 COMPLETE CBC W/AUTO DIFF WBC: CPT

## 2018-05-31 PROCEDURE — 96375 TX/PRO/DX INJ NEW DRUG ADDON: CPT

## 2018-05-31 PROCEDURE — 36415 COLL VENOUS BLD VENIPUNCTURE: CPT

## 2018-05-31 PROCEDURE — 85730 THROMBOPLASTIN TIME PARTIAL: CPT

## 2018-05-31 PROCEDURE — 99285 EMERGENCY DEPT VISIT HI MDM: CPT

## 2018-05-31 RX ADMIN — METOCLOPRAMIDE HYDROCHLORIDE 1 MG: 10 INJECTION, SOLUTION INTRAMUSCULAR; INTRAVENOUS at 22:29

## 2018-05-31 RX ADMIN — DIPHENHYDRAMINE HYDROCHLORIDE 1 MG: 50 INJECTION, SOLUTION INTRAMUSCULAR; INTRAVENOUS at 22:29

## 2018-05-31 RX ADMIN — THIAMINE HYDROCHLORIDE 1 MLS/HR: 100 INJECTION, SOLUTION INTRAMUSCULAR; INTRAVENOUS at 22:33

## 2018-06-01 RX ADMIN — IOHEXOL 1 ML: 300 INJECTION, SOLUTION INTRAVENOUS at 00:15

## 2018-06-01 RX ADMIN — VASOPRESSIN 1: 20 INJECTION, SOLUTION INTRAMUSCULAR; SUBCUTANEOUS at 00:15

## 2018-10-04 ENCOUNTER — HOSPITAL ENCOUNTER (EMERGENCY)
Dept: HOSPITAL 54 - ER | Age: 58
Discharge: HOME | End: 2018-10-04
Payer: COMMERCIAL

## 2018-10-04 VITALS — DIASTOLIC BLOOD PRESSURE: 70 MMHG | SYSTOLIC BLOOD PRESSURE: 132 MMHG

## 2018-10-04 VITALS — WEIGHT: 126 LBS | BODY MASS INDEX: 20.25 KG/M2 | HEIGHT: 66 IN

## 2018-10-04 DIAGNOSIS — Z88.0: ICD-10-CM

## 2018-10-04 DIAGNOSIS — R51: ICD-10-CM

## 2018-10-04 DIAGNOSIS — F17.200: ICD-10-CM

## 2018-10-04 DIAGNOSIS — F41.9: ICD-10-CM

## 2018-10-04 DIAGNOSIS — R11.2: Primary | ICD-10-CM

## 2018-10-04 DIAGNOSIS — R19.7: ICD-10-CM

## 2018-10-04 DIAGNOSIS — I10: ICD-10-CM

## 2018-10-04 LAB
ALBUMIN SERPL BCP-MCNC: 4.1 G/DL (ref 3.4–5)
ALP SERPL-CCNC: 104 U/L (ref 46–116)
ALT SERPL W P-5'-P-CCNC: 20 U/L (ref 12–78)
AST SERPL W P-5'-P-CCNC: 20 U/L (ref 15–37)
BASOPHILS # BLD AUTO: 0 /CMM (ref 0–0.2)
BASOPHILS NFR BLD AUTO: 0.3 % (ref 0–2)
BILIRUB DIRECT SERPL-MCNC: 0.1 MG/DL (ref 0–0.2)
BILIRUB SERPL-MCNC: 0.3 MG/DL (ref 0.2–1)
BUN SERPL-MCNC: 7 MG/DL (ref 7–18)
CALCIUM SERPL-MCNC: 8.5 MG/DL (ref 8.5–10.1)
CHLORIDE SERPL-SCNC: 97 MMOL/L (ref 98–107)
CO2 SERPL-SCNC: 27 MMOL/L (ref 21–32)
CREAT SERPL-MCNC: 0.8 MG/DL (ref 0.6–1.3)
EOSINOPHIL NFR BLD AUTO: 0.2 % (ref 0–6)
GLUCOSE SERPL-MCNC: 146 MG/DL (ref 74–106)
HCT VFR BLD AUTO: 36 % (ref 33–45)
HGB BLD-MCNC: 12.4 G/DL (ref 11.5–14.8)
LIPASE SERPL-CCNC: 160 U/L (ref 73–393)
LYMPHOCYTES NFR BLD AUTO: 1 /CMM (ref 0.8–4.8)
LYMPHOCYTES NFR BLD AUTO: 9.6 % (ref 20–44)
MCHC RBC AUTO-ENTMCNC: 35 G/DL (ref 31–36)
MCV RBC AUTO: 90 FL (ref 82–100)
MONOCYTES NFR BLD AUTO: 0.6 /CMM (ref 0.1–1.3)
MONOCYTES NFR BLD AUTO: 5.7 % (ref 2–12)
NEUTROPHILS # BLD AUTO: 8.6 /CMM (ref 1.8–8.9)
NEUTROPHILS NFR BLD AUTO: 84.2 % (ref 43–81)
PLATELET # BLD AUTO: 284 /CMM (ref 150–450)
POTASSIUM SERPL-SCNC: 4.6 MMOL/L (ref 3.5–5.1)
PROT SERPL-MCNC: 7.1 G/DL (ref 6.4–8.2)
RBC # BLD AUTO: 3.96 MIL/UL (ref 4–5.2)
RDW COEFFICIENT OF VARIATION: 13.6 (ref 11.5–15)
SODIUM SERPL-SCNC: 131 MMOL/L (ref 136–145)
WBC NRBC COR # BLD AUTO: 10.3 K/UL (ref 4.3–11)

## 2018-10-04 PROCEDURE — 80048 BASIC METABOLIC PNL TOTAL CA: CPT

## 2018-10-04 PROCEDURE — 85025 COMPLETE CBC W/AUTO DIFF WBC: CPT

## 2018-10-04 PROCEDURE — A4606 OXYGEN PROBE USED W OXIMETER: HCPCS

## 2018-10-04 PROCEDURE — 96374 THER/PROPH/DIAG INJ IV PUSH: CPT

## 2018-10-04 PROCEDURE — 36415 COLL VENOUS BLD VENIPUNCTURE: CPT

## 2018-10-04 PROCEDURE — Z7610: HCPCS

## 2018-10-04 PROCEDURE — 96375 TX/PRO/DX INJ NEW DRUG ADDON: CPT

## 2018-10-04 PROCEDURE — 83690 ASSAY OF LIPASE: CPT

## 2018-10-04 PROCEDURE — 99284 EMERGENCY DEPT VISIT MOD MDM: CPT

## 2018-10-04 PROCEDURE — 80076 HEPATIC FUNCTION PANEL: CPT

## 2018-10-04 PROCEDURE — 96361 HYDRATE IV INFUSION ADD-ON: CPT

## 2018-10-04 RX ADMIN — SODIUM CHLORIDE ONE ML: 9 INJECTION, SOLUTION INTRAVENOUS at 11:28

## 2018-10-04 RX ADMIN — HYDROMORPHONE HYDROCHLORIDE ONE MG: 1 INJECTION, SOLUTION INTRAMUSCULAR; INTRAVENOUS; SUBCUTANEOUS at 11:58

## 2018-10-04 RX ADMIN — SODIUM CHLORIDE ONE MG: 9 INJECTION, SOLUTION INTRAVENOUS at 11:29

## 2018-10-04 NOTE — NUR
SARAH FROM HOME DT DIZZINESS, NAUSEA AND VOMITTIN SINCE YESTERDAY. PATIENT NOT 
IN DISTRESS. SKIN IS WARM TO TOUCH AND NON DIAPHORETIC. PATIENT IS AFEBRILE. 
VSS. PATIENT CONNECTED TO TELE MONITOR. MD AT BEDSIDE FOR EVAL.

## 2018-10-13 ENCOUNTER — HOSPITAL ENCOUNTER (EMERGENCY)
Dept: HOSPITAL 91 - FTE | Age: 58
Discharge: HOME | End: 2018-10-13
Payer: COMMERCIAL

## 2018-10-13 ENCOUNTER — HOSPITAL ENCOUNTER (EMERGENCY)
Age: 58
Discharge: HOME | End: 2018-10-13

## 2018-10-13 DIAGNOSIS — S52.502A: Primary | ICD-10-CM

## 2018-10-13 DIAGNOSIS — Z87.891: ICD-10-CM
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
## 2018-10-13 DIAGNOSIS — I10: ICD-10-CM
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
## 2018-10-13 PROCEDURE — 73130 X-RAY EXAM OF HAND: CPT

## 2018-10-13 PROCEDURE — 29125 APPL SHORT ARM SPLINT STATIC: CPT

## 2018-10-13 PROCEDURE — 99283 EMERGENCY DEPT VISIT LOW MDM: CPT

## 2018-10-13 PROCEDURE — 73090 X-RAY EXAM OF FOREARM: CPT

## 2018-10-13 PROCEDURE — 73110 X-RAY EXAM OF WRIST: CPT

## 2018-10-13 RX ADMIN — HYDROCODONE BITARTRATE AND ACETAMINOPHEN 1 TAB: 5; 325 TABLET ORAL at 15:05

## 2019-01-22 ENCOUNTER — HOSPITAL ENCOUNTER (EMERGENCY)
Dept: HOSPITAL 10 - E/R | Age: 59
Discharge: HOME | End: 2019-01-22
Payer: COMMERCIAL

## 2019-01-22 ENCOUNTER — HOSPITAL ENCOUNTER (EMERGENCY)
Dept: HOSPITAL 91 - E/R | Age: 59
Discharge: HOME | End: 2019-01-22
Payer: COMMERCIAL

## 2019-01-22 VITALS — RESPIRATION RATE: 18 BRPM | SYSTOLIC BLOOD PRESSURE: 122 MMHG | HEART RATE: 90 BPM | DIASTOLIC BLOOD PRESSURE: 72 MMHG

## 2019-01-22 VITALS
HEIGHT: 66 IN | WEIGHT: 149.91 LBS | BODY MASS INDEX: 24.09 KG/M2 | HEIGHT: 66 IN | BODY MASS INDEX: 24.09 KG/M2 | WEIGHT: 149.91 LBS

## 2019-01-22 DIAGNOSIS — J18.1: Primary | ICD-10-CM

## 2019-01-22 DIAGNOSIS — R10.13: ICD-10-CM

## 2019-01-22 DIAGNOSIS — R40.2362: ICD-10-CM

## 2019-01-22 DIAGNOSIS — I10: ICD-10-CM

## 2019-01-22 DIAGNOSIS — Z87.891: ICD-10-CM

## 2019-01-22 DIAGNOSIS — R40.2252: ICD-10-CM

## 2019-01-22 DIAGNOSIS — R40.2142: ICD-10-CM

## 2019-01-22 LAB
ADD MAN DIFF?: YES
ADD UMIC: YES
ALANINE AMINOTRANSFERASE: 19 IU/L (ref 13–69)
ALBUMIN/GLOBULIN RATIO: 1.23
ALBUMIN: 3.7 G/DL (ref 3.3–4.9)
ALKALINE PHOSPHATASE: 114 IU/L (ref 42–121)
ANION GAP: 9 (ref 5–13)
ANISOCYTOSIS: (no result) (ref 0–0)
ASPARTATE AMINO TRANSFERASE: 27 IU/L (ref 15–46)
BAND NEUTROPHILS #M: 4.1 10^3/UL (ref 0–0.6)
BAND NEUTROPHILS % (M): 45 % (ref 0–4)
BILIRUBIN,DIRECT: 0 MG/DL (ref 0–0.2)
BILIRUBIN,TOTAL: 0 MG/DL (ref 0.2–1.3)
BLOOD UREA NITROGEN: 16 MG/DL (ref 7–20)
CALCIUM: 8.9 MG/DL (ref 8.4–10.2)
CARBON DIOXIDE: 25 MMOL/L (ref 21–31)
CHLORIDE: 97 MMOL/L (ref 97–110)
CK INDEX: 1.6
CK-MB: 1.22 NG/ML (ref 0–2.4)
CREATINE KINASE: 78 IU/L (ref 23–200)
CREATININE: 0.68 MG/DL (ref 0.44–1)
GIANT THROMBO% (M): 1 % (ref 0–0)
GLOBULIN: 3 G/DL (ref 1.3–3.2)
GLUCOSE: 83 MG/DL (ref 70–220)
HEMATOCRIT: 31.6 % (ref 37–47)
HEMOGLOBIN: 10.7 G/DL (ref 12–16)
IMMATURE GRANS #M: 0.04 10^3/UL (ref 0–0.03)
IMMATURE GRANS % (M): 0.4 % (ref 0–0.43)
LIPASE: 30 U/L (ref 23–300)
LYMPHOCYTES #M: 0.9 10^3/UL (ref 0.8–2.9)
LYMPHOCYTES % (M): 10 % (ref 15–51)
MEAN CORPUSCULAR HEMOGLOBIN: 28.8 PG (ref 29–33)
MEAN CORPUSCULAR HGB CONC: 33.9 G/DL (ref 32–37)
MEAN CORPUSCULAR VOLUME: 85.2 FL (ref 82–101)
MEAN PLATELET VOLUME: 10.3 FL (ref 7.4–10.4)
MONOCYTE #M: 0.4 10^3/UL (ref 0.3–0.9)
MONOCYTES % (M): 5 % (ref 0–11)
NUCLEATED RED BLOOD CELLS%: 0 /100WBC (ref 0–0)
PLATELET COUNT: 315 10^3/UL (ref 140–415)
PLATELET ESTIMATE: NORMAL
POIKILOCYTOSIS: (no result) (ref 0–0)
POLYCHROMASIA: (no result) (ref 0–0)
POSITIVE DIFF: (no result)
POTASSIUM: 5 MMOL/L (ref 3.5–5.1)
REACTIVE LYMPHOCYTES #M: 0.7 10^3/UL (ref 0–0)
REACTIVE LYMPHOCYTES% (M): 8 % (ref 0–0)
RED BLOOD COUNT: 3.71 10^6/UL (ref 4.2–5.4)
RED CELL DISTRIBUTION WIDTH: 16.3 % (ref 11.5–14.5)
SEG NEUT #M: 3.4 10^3/UL (ref 1.6–7.5)
SEGMENTED NEUTROPHILS (M) %: 32 % (ref 39–77)
SMUDGE%M: 5 % (ref 0–0)
SODIUM: 131 MMOL/L (ref 135–144)
TOTAL PROTEIN: 6.7 G/DL (ref 6.1–8.1)
TROPONIN-I: < 0.012 NG/ML (ref 0–0.12)
UR ASCORBIC ACID: NEGATIVE MG/DL
UR BILIRUBIN (DIP): NEGATIVE MG/DL
UR BLOOD (DIP): (no result) MG/DL
UR CLARITY: CLEAR
UR COLOR: YELLOW
UR GLUCOSE (DIP): NEGATIVE MG/DL
UR KETONES (DIP): NEGATIVE MG/DL
UR LEUKOCYTE ESTERASE (DIP): NEGATIVE LEU/UL
UR NITRITE (DIP): NEGATIVE MG/DL
UR PH (DIP): 6 (ref 5–9)
UR RBC: 0 /HPF (ref 0–5)
UR SPECIFIC GRAVITY (DIP): 1 (ref 1–1.03)
UR SQUAMOUS EPITHELIAL CELL: (no result) /HPF
UR TOTAL PROTEIN (DIP): NEGATIVE MG/DL
UR UROBILINOGEN (DIP): NEGATIVE MG/DL
UR WBC: 0 /HPF (ref 0–5)
WHITE BLOOD COUNT: 9.3 10^3/UL (ref 4.8–10.8)

## 2019-01-22 PROCEDURE — 84484 ASSAY OF TROPONIN QUANT: CPT

## 2019-01-22 PROCEDURE — 96376 TX/PRO/DX INJ SAME DRUG ADON: CPT

## 2019-01-22 PROCEDURE — 81001 URINALYSIS AUTO W/SCOPE: CPT

## 2019-01-22 PROCEDURE — 85025 COMPLETE CBC W/AUTO DIFF WBC: CPT

## 2019-01-22 PROCEDURE — 96375 TX/PRO/DX INJ NEW DRUG ADDON: CPT

## 2019-01-22 PROCEDURE — 80053 COMPREHEN METABOLIC PANEL: CPT

## 2019-01-22 PROCEDURE — 74177 CT ABD & PELVIS W/CONTRAST: CPT

## 2019-01-22 PROCEDURE — 36415 COLL VENOUS BLD VENIPUNCTURE: CPT

## 2019-01-22 PROCEDURE — 82550 ASSAY OF CK (CPK): CPT

## 2019-01-22 PROCEDURE — 96374 THER/PROPH/DIAG INJ IV PUSH: CPT

## 2019-01-22 PROCEDURE — 93005 ELECTROCARDIOGRAM TRACING: CPT

## 2019-01-22 PROCEDURE — 83690 ASSAY OF LIPASE: CPT

## 2019-01-22 PROCEDURE — 82553 CREATINE MB FRACTION: CPT

## 2019-01-22 PROCEDURE — 99285 EMERGENCY DEPT VISIT HI MDM: CPT

## 2019-01-22 PROCEDURE — 71250 CT THORAX DX C-: CPT

## 2019-01-22 RX ADMIN — VASOPRESSIN 1 ML/12 SEC: 20 INJECTION, SOLUTION INTRAMUSCULAR; SUBCUTANEOUS at 13:45

## 2019-01-22 RX ADMIN — PHENOBARBITAL, HYOSCYAMINE SULFATE, ATROPINE SULFATE, SCOPOLAMINE HYDROBROMIDE 1 TAB: 16.2; .1037; .0194; .0065 TABLET ORAL at 14:56

## 2019-01-22 RX ADMIN — ONDANSETRON HYDROCHLORIDE 1 MG: 2 INJECTION, SOLUTION INTRAMUSCULAR; INTRAVENOUS at 16:15

## 2019-01-22 RX ADMIN — KETOROLAC TROMETHAMINE 1 MG: 30 INJECTION, SOLUTION INTRAMUSCULAR at 14:57

## 2019-01-22 RX ADMIN — ONDANSETRON HYDROCHLORIDE 1 MG: 2 INJECTION, SOLUTION INTRAMUSCULAR; INTRAVENOUS at 13:22

## 2019-01-22 RX ADMIN — SODIUM CHLORIDE 1 ML: 9 INJECTION, SOLUTION INTRAMUSCULAR; INTRAVENOUS; SUBCUTANEOUS at 13:45

## 2019-01-22 RX ADMIN — HYDROMORPHONE HYDROCHLORIDE 1 MG: 1 INJECTION, SOLUTION INTRAMUSCULAR; INTRAVENOUS; SUBCUTANEOUS at 13:22

## 2019-01-22 RX ADMIN — LORAZEPAM 1 MG: 2 INJECTION, SOLUTION INTRAMUSCULAR; INTRAVENOUS at 14:57

## 2019-01-22 RX ADMIN — ALUMINUM HYDROXIDE, MAGNESIUM HYDROXIDE, DIMETHICONE 1 ML: 200; 200; 20 SUSPENSION ORAL at 14:57

## 2019-01-22 RX ADMIN — IOHEXOL 1 ML: 300 INJECTION, SOLUTION INTRAVENOUS at 13:46

## 2019-01-22 NOTE — ERD
ER Documentation


Chief Complaint


Chief Complaint





lowr abdominal pain started yesterday, + diarrhea, - vomiting/fever





HPI


This is a 58-year-old female presents to the emergency department complaining of


generalized myalgias for the past 24 hours.  She indicates she had a productive 


cough, tactile fever with shaking and chills, posttussive emesis and loose 


watery stools.  She also complains of severe abdominal cramping.  She had no 


recent hospitalizations.  She has not recently taken any antibiotics.  The 


patient denies any shortness of breath at rest or exertion no recent travel.  


The patient states she takes Norco for chronic pain due to a shoulder injury 


several years prior to arrival.  She however states she did not take any Norco 


for the past several days nor has she taken any antipyretics.  She denies any 


night sweats.  She has no risk factors for tuberculosis.  She denies tobacco 


use.





ROS


All systems reviewed and are negative except as per history of present illness.





Medications


Home Meds


Active Scripts


Benzonatate* (Tessalon Perle*) 100 Mg Capsule, 100 MG PO Q8H PRN for COUGH, #20 


CAP


   Prov:LORETO RAMOS MD         19


Azithromycin* (Zithromax*) 500 Mg Tablet, 500 MG PO DAILY for 5 Days, TAB


   Prov:LORETO RAMOS MD         19


Acetaminophen with Codeine (Acetaminophen-Cod #3 Tablet) 1 Each Tablet, 1 TAB PO


Q6H PRN for PAIN LEVEL 6-10, #20 TAB


   Prov:SHUKRI FERGUSON         10/13/18


Acetamin/Butalbital/Caffeine* (Fioricet*) [id number] Tab, 1 TAB PO Q6H PRN 


for PAIN, #30 TAB


   Prov:ARELY MILLS NP         18


Meloxicam* (Mobic*) 15 Mg Tablet, 15 MG PO DAILY, #30 TAB


   Prov:GAVIN VALDERRAMA PA-C         17


Reported Medications


Hydroxyzine Hcl* (Atarax*) 50 Mg Tab, 50 MG PO Q8H PRN for ITCHING, TAB


   19


Metoprolol Succinate* (Toprol XL*) 25 Mg Tab.sr.24h, 25 MG PO DAILY, #30 TAB


   19


Omeprazole* (Omeprazole*) 40 Mg Capsule.dr, 40 MG PO AC BREAKFAST, #30 CAP


   19


Gabapentin* (Gabapentin*) 300 Mg Capsule, 300 MG PO TID, #90 CAP


   19


Lovastatin (Lovastatin) 40 Mg Tablet, 40 MG PO DAILY, TAB


   16


Alprazolam* (Xanax*) 2 Mg Tablet, 2 MG PO Q8H PRN for ANXIETY, TAB


   16


Primidone* (Mysoline*) 250 Mg Tablet, 250 MG PO BID


   12


Discontinued Reported Medications


[lamotrigine]   No Conflict Check


   16


[atarax]   No Conflict Check


   16


[levothyroxine]   No Conflict Check


   16





Allergies


Allergies:  


Coded Allergies:  


     Penicillins (Verified  Allergy, Unknown, RASH, 16)





PMhx/Soc


History of Surgery:  Yes (BILAT ROTATOR CUFF REPLACEMENT, B hand SX)


Anesthesia Reaction:  No


Hx Neurological Disorder:  Yes (MIGRAINE HA)


Hx Respiratory Disorders:  No


Hx Cardiac Disorders:  Yes (HTN)


Hx Psychiatric Problems:  Yes (ANXIETY, DEPRESSION)


Hx Miscellaneous Medical Probl:  Yes (CHRONIC MIGRAINES SEIZURE , ANIETY, HTN )


Hx Alcohol Use:  No


Hx Substance Use:  No


Hx Tobacco Use:  Yes (E-CIG 5/DAY)


Smoking Status:  Former smoker





Physical Exam


Vitals





Vital Signs


  Date      Temp  Pulse  Resp  B/P (MAP)   Pulse Ox  O2          O2 Flow    FiO2


Time                                                 Delivery    Rate


   19  98.3     93    20      121/59        97


     11:44                           (79)





Physical Exam


Constitutional:Well-developed. Well-nourished.  Patient tearful appear to be in 


a significant amount of discomfort


HEENT:Normocephalic. Atraumatic.Pupils were equal round reactive to light.  Very


 dry mucous membranes.No tonsillar exudates.


Neck: No nuchal rigidity. No lymphadenopathy. No posterior cervical spine 


tenderness or step-offs.


Respiratory: Not using accessory muscles of respiration.Lungs were clear to 


auscultation bilaterally. No rhonchi. No rales. No wheezing. 


Cardiovascular: Regular rate regular rhythm.No murmurs. No rubs were 


appreciated.S1, S2 normal. Distal pulses are palpable 2+ bilaterally.


GI: Abdomen was soft.  Epigastric tenderness and tenderness in the right lower 


quadrant not specifically over McBurney's point and psoas sign and obturator si


gn are both negative non Distended. No pulsatile abdominal masses or bruits. No 


rebound. No guarding. Bowel sounds were present and normal. 


Muscle skeletal: Full range of motion of both the upper and lower extremities 


bilaterally.Normal muscle tone.No assymetrical calf tenderness or swelling. 


Skin: No petechia, no purpura. No lesions on the palms or the soles of the feet.


 No maculopapular rash.


NEURO: Patient was alert, awake, orientated x3.No facial droop. Gait observed 


and normal with no ataxia.Speech had regular rate and rhythm. No focal 


neurological deficits.


Result Diagram:  


19 1235                                                                    


            19 1235





Results 24 hrs





Laboratory Tests


              Test
                                  19
12:35


              White Blood Count                       9.3 10^3/ul


              Red Blood Count                        3.71 10^6/ul


              Hemoglobin                                10.7 g/dl


              Hematocrit                                   31.6 %


              Mean Corpuscular Volume                     85.2 fl


              Mean Corpuscular Hemoglobin                 28.8 pg


              Mean Corpuscular Hemoglobin
Concent      33.9 g/dl 



              Red Cell Distribution Width                  16.3 %


              Platelet Count                          315 10^3/UL


              Mean Platelet Volume                        10.3 fl


              Immature Granulocytes %                     0.400 %


              Neutrophils %                         %


              Segmented Neutrophils %
(Manual)              32 % 



              Band Neutrophils % (Manual)                    45 %


              Lymphocytes %                         %


              Lymphocytes % (Manual)                         10 %


              Reactive Lymphocytes %
(Manual)                8 % 



              Monocytes %                           %


              Monocytes % (Manual)                            5 %


              Eosinophils %                         %


              Basophils %                           %


              Nucleated Red Blood Cells %             0.0 /100WBC


              Immature Granulocytes #               0.040 10^3/ul


              Neutrophils #                         10^3/ul


              Neutrophils # (Manual)                  3.4 10^3/ul


              Band Neutrophils #                      4.1 10^3/ul


              Lymphocytes (Manual)                    0.9 10^3/ul


              Lymphocytes #                         10^3/ul


              Reactive Lymphocytes #                  0.7 10^3/ul


              Monocytes #                           10^3/ul


              Monocytes # (Manual)                    0.4 10^3/ul


              Eosinophils #                         10^3/ul


              Basophils #                           10^3/ul


              Nucleated Red Blood Cells #           10^3/ul


              Platelet Estimate                    NORMAL


              Giant Platelets                                 1 %


              Polychromasia                                    2+


              Poikilocytosis                                   1+


              Anisocytosis                                     1+


              Urine Color                          YELLOW


              Urine Clarity                        CLEAR


              Urine pH                                        6.0


              Urine Specific Gravity                        1.004


              Urine Ketones                        NEGATIVE mg/dL


              Urine Nitrite                        NEGATIVE mg/dL


              Urine Bilirubin                      NEGATIVE mg/dL


              Urine Urobilinogen                   NEGATIVE mg/dL


              Urine Leukocyte Esterase
            NEGATIVE
Ken/ul


              Urine Microscopic RBC                        0 /HPF


              Urine Microscopic WBC                        0 /HPF


              Urine Squamous Epithelial
Cells      FEW /HPF 



              Urine Hemoglobin                           1+ mg/dL


              Urine Glucose                        NEGATIVE mg/dL


              Urine Total Protein                  NEGATIVE mg/dl


              Sodium Level                             131 mmol/L


              Potassium Level                          5.0 mmol/L


              Chloride Level                            97 mmol/L


              Carbon Dioxide Level                      25 mmol/L


              Anion Gap                                         9


              Blood Urea Nitrogen                        16 mg/dl


              Creatinine                               0.68 mg/dl


              Est Glomerular Filtrat Rate
mL/min   > 60 mL/min 



              Glucose Level                              83 mg/dl


              Calcium Level                             8.9 mg/dl


              Total Bilirubin                           0.0 mg/dl


              Direct Bilirubin                         0.00 mg/dl


              Indirect Bilirubin                        0.0 mg/dl


              Aspartate Amino Transf
(AST/SGOT)          27 IU/L 



              Alanine Aminotransferase
(ALT/SGPT)        19 IU/L 



              Alkaline Phosphatase                       114 IU/L


              Creatine Kinase                             78 IU/L


              Creatine Kinase Index                           1.6


              Creatinine Kinase MB (Mass)              1.22 ng/ml


              Troponin I                           < 0.012 ng/ml


              Total Protein                              6.7 g/dl


              Albumin                                    3.7 g/dl


              Globulin                                  3.00 g/dl


              Albumin/Globulin Ratio                         1.23


              Lipase                                       30 U/L





Current Medications


 Medications
   Dose
          Sig/Maricarmen
       Start Time
   Status  Last


 (Trade)       Ordered        Route
 PRN     Stop Time              Admin
Dose


                              Reason                                Admin


                1 mg           ONCE  STAT
    19       DC           19


Hydromorphone                 IV
            13:16
                       13:22



HCl
                                         19 13:18


(Dilaudid)


 Ondansetron    4 mg           ONCE  STAT
    19       DC           19


HCl
  (Zofran                 IV
            13:16
                       13:22



Inj)                                         19 13:18


 IV Flush
      10 ml          STK-MED        19       DC           19


(NS 10 ml)                    ONCE
 .ROUTE
  13:28
                       13:45



                                             19 13:29


 Sodium         100 ml @ ud    STK-MED        19       DC           19


Chloride                      ONCE
 .ROUTE
  13:28
                       13:45



                                             19 13:29


 Iohexol
       150 ml         STK-MED        19       DC           19


(Omnipaque                    ONCE
 .ROUTE
  13:28
                       13:46



300mg/
 ml)                                  19 13:29


 Lorazepam
     1 mg           ONCE  ONCE
    19       DC           19


(Ativan)                      IV
            15:00
                       14:57



                                             19 15:01


 Ketorolac
     30 mg          ONCE  STAT
    19       DC           19


Tromethamine
                 IV
            14:43
                       14:57



 (Toradol)                                   19 14:48


                40 ml          ONCE  STAT
    19       DC           19


Miscellaneous                 PO
            14:46
                       14:57




 Medication
                                19 14:48


 (Gi Cocktail


(2))


 Belladonna/
   2 tab          ONCE  STAT
    19       DC           19


Phenobarbital                 PO
            14:46
                       14:56




  (Donnatal)                                19 14:48


 Ondansetron    4 mg           ONCE  STAT
    19       DC           19


HCl
  (Zofran                 IV
            16:08
                       16:15



Inj)                                         19 16:09








Procedures/MDM


This patient presented to the emergency department with abdominal pain and was 


seen and evaluated by myself. My differential diagnosis included but was not 


limited to abdominal aortic aneurysm, appendicitis, pancreatitis, perforated 


peptic ulcer, perforated viscus, Boerhaaves syndrome or visceral pain such as


 diverticulitis, DKA, esophagitis, hepatitis or bowel obstruction.





The patient was placed on a cardiac monitor, continuous pulse oximetry, and IV 


access was established by nursing staff.  The patient was given intravenous 


morphine for analgesia control and IV fluids.





I obtained a 12-lead EKG tracing to rule out for atypical microinfarction.


12 Lead EKG tracing ordered and reviewed by myself showed: 


Normal sinus rhythm of 88 bpm and no arrhythmia.


FL interval normal.


QRS duration normal.


No ST segment elevation


No ST segment depression. No changes consistent with acute ischemia. 





Due to the severity of the patient's abdominal pain I do feel is necessary to 


obtain a CT scan of the abdomen which was reviewed by the radiologist myself.  


It indicated the followin.  Patchy bilateral basilar ground-glass interstitial opacities may reflect 


small airways infection or inflammation. Short interval follow-up CT of the 


chest is recommended to ensure resolution.


2.  Small bilateral renal cysts.


3.  Moderate volume stool throughout the colon suggesting constipation. There is


 mild mucosal thickening of the distal sigmoid colon which may be secondary to 


under distension, reflect changes of constipation, or mild colitis. No 


significant pericolonic fat stranding is noted.


4.  Mild compression fracture of the T12 vertebral body.


5.  Senescent changes with aortic atherosclerosis and degenerative changes of 


the spine.





The patient had no atypical findings on physical examination with respect to her


 respiratory status however she did complain of a productive cough.  Therefore 


after the incidental findings of the CT scan of the abdomen I did feel is 


necessary to obtain a CT scan of the chest.  This was also reviewed by the 


radiologist and indicated the followin. Scattered bilateral ground-glass air space disease, with interlobular and 


interlobular thickening, most severe within the upper lobes. Differential 


includes but not limited to pneumonia, edema, hemorrhage, and pulmonary alveolar


 proteinosis. Correlate with clinical history.


2.  No evidence of focal consolidation or pleural effusions. Airways are patent.


3.  Mild atherosclerotic disease of the aorta.





The patient no severe loculate abnormalities.  There is no leukocytosis.  The 


patient's pain had improved.  She was given a GI cocktail for epigastric 


discomfort.  The patient states she felt comfortable being discharged home.  She


 was given a prescription of azithromycin as she states she has a severe 


anaphylactic reaction to penicillins.  She was also given Tessalon Perles as an 


antitussive.  The patient was discharged home in fair condition. They were 


instructed to return to the emergency department at any time if there was any 


worsening of their condition. The patient stated they would follow up with their


 PCP in the next 24-48 hours to initiate a suitable medication regimen under the


 care of their PCP as well as to allow their PCP to monitor any drug reactions. 


The patient was discharged home with prescriptions after they gave informed 


consent to the new medication.  They were also fully informed by myself on the 


adverse effects and adverse drug interactions in order to provide adequate 


safeguards to prevent possible adverse reactions to medications.





Departure


Diagnosis:  


   Primary Impression:  


   Abdominal pain


   Abdominal location:  epigastric  Qualified Codes:  R10.13 - Epigastric pain


   Additional Impression:  


   Pneumonia


   Pneumonia type:  due to unspecified organism  Laterality:  bilateral  Lung 


   location:  upper lobe of lung  Qualified Codes:  J18.1 - Lobar pneumonia, 


   unspecified organism


Condition:  Fair


Patient Instructions:  Pneumonia











LORETO RAMOS MD            2019 16:25

## 2019-01-24 ENCOUNTER — HOSPITAL ENCOUNTER (INPATIENT)
Dept: HOSPITAL 91 - ICU | Age: 59
LOS: 12 days | Discharge: HOME HEALTH | DRG: 871 | End: 2019-02-05
Payer: COMMERCIAL

## 2019-01-24 DIAGNOSIS — D64.9: ICD-10-CM

## 2019-01-24 DIAGNOSIS — G92: ICD-10-CM

## 2019-01-24 DIAGNOSIS — F32.9: ICD-10-CM

## 2019-01-24 DIAGNOSIS — J44.0: ICD-10-CM

## 2019-01-24 DIAGNOSIS — J80: ICD-10-CM

## 2019-01-24 DIAGNOSIS — R33.9: ICD-10-CM

## 2019-01-24 DIAGNOSIS — E87.1: ICD-10-CM

## 2019-01-24 DIAGNOSIS — J96.01: ICD-10-CM

## 2019-01-24 DIAGNOSIS — R10.32: ICD-10-CM

## 2019-01-24 DIAGNOSIS — J18.9: ICD-10-CM

## 2019-01-24 DIAGNOSIS — Z88.0: ICD-10-CM

## 2019-01-24 DIAGNOSIS — G43.909: ICD-10-CM

## 2019-01-24 DIAGNOSIS — E83.42: ICD-10-CM

## 2019-01-24 DIAGNOSIS — J44.1: ICD-10-CM

## 2019-01-24 DIAGNOSIS — J20.9: ICD-10-CM

## 2019-01-24 DIAGNOSIS — R56.9: ICD-10-CM

## 2019-01-24 DIAGNOSIS — F17.290: ICD-10-CM

## 2019-01-24 DIAGNOSIS — E87.5: ICD-10-CM

## 2019-01-24 DIAGNOSIS — I10: ICD-10-CM

## 2019-01-24 DIAGNOSIS — A41.9: Primary | ICD-10-CM

## 2019-01-24 DIAGNOSIS — F41.9: ICD-10-CM

## 2019-01-24 LAB
ADD MAN DIFF?: YES
ANION GAP: 9 (ref 5–13)
ANISOCYTOSIS: (no result) (ref 0–0)
BAND NEUTROPHILS #M: 1 10^3/UL (ref 0–0.6)
BAND NEUTROPHILS % (M): 10 % (ref 0–4)
BASOPHIL #M: 0.1 10^3/UL (ref 0–0)
BASOPHILS % (M): 1 % (ref 0–2)
BLOOD UREA NITROGEN: 7 MG/DL (ref 7–20)
CALCIUM: 9.1 MG/DL (ref 8.4–10.2)
CARBON DIOXIDE: 24 MMOL/L (ref 21–31)
CHLORIDE: 103 MMOL/L (ref 97–110)
CREATININE: 0.71 MG/DL (ref 0.44–1)
GLUCOSE: 112 MG/DL (ref 70–220)
HEMATOCRIT: 28.5 % (ref 37–47)
HEMOGLOBIN: 9.8 G/DL (ref 12–16)
HYPOCHROMASIA: (no result) (ref 0–0)
IMMATURE GRANS #M: 0.05 10^3/UL (ref 0–0.03)
IMMATURE GRANS % (M): 0.5 % (ref 0–0.43)
LACTIC ACID: 1.6 MMOL/L (ref 0.5–2)
LYMPHOCYTES #M: 0.6 10^3/UL (ref 0.8–2.9)
LYMPHOCYTES % (M): 6 % (ref 15–51)
MEAN CORPUSCULAR HEMOGLOBIN: 29.1 PG (ref 29–33)
MEAN CORPUSCULAR HGB CONC: 34.4 G/DL (ref 32–37)
MEAN CORPUSCULAR VOLUME: 84.6 FL (ref 82–101)
MEAN PLATELET VOLUME: 9.5 FL (ref 7.4–10.4)
MONOCYTE #M: 0.9 10^3/UL (ref 0.3–0.9)
MONOCYTES % (M): 9 % (ref 0–11)
NUCLEATED RED BLOOD CELLS%: 0 /100WBC (ref 0–0)
PLATELET COUNT: 294 10^3/UL (ref 140–415)
PLATELET ESTIMATE: NORMAL
POLYCHROMASIA: (no result) (ref 0–0)
POSITIVE DIFF: (no result)
POTASSIUM: 4.5 MMOL/L (ref 3.5–5.1)
REACTIVE LYMPHOCYTES #M: 0.4 10^3/UL (ref 0–0)
REACTIVE LYMPHOCYTES% (M): 4 % (ref 0–0)
RED BLOOD COUNT: 3.37 10^6/UL (ref 4.2–5.4)
RED CELL DISTRIBUTION WIDTH: 17 % (ref 11.5–14.5)
SEG NEUT #M: 7.7 10^3/UL (ref 1.6–7.5)
SEGMENTED NEUTROPHILS (M) %: 70 % (ref 39–77)
SMUDGE%M: 5 % (ref 0–0)
SODIUM: 136 MMOL/L (ref 135–144)
WHITE BLOOD COUNT: 10.9 10^3/UL (ref 4.8–10.8)

## 2019-01-24 PROCEDURE — 80069 RENAL FUNCTION PANEL: CPT

## 2019-01-24 PROCEDURE — 84155 ASSAY OF PROTEIN SERUM: CPT

## 2019-01-24 PROCEDURE — 82962 GLUCOSE BLOOD TEST: CPT

## 2019-01-24 PROCEDURE — 85025 COMPLETE CBC W/AUTO DIFF WBC: CPT

## 2019-01-24 PROCEDURE — 71045 X-RAY EXAM CHEST 1 VIEW: CPT

## 2019-01-24 PROCEDURE — 83735 ASSAY OF MAGNESIUM: CPT

## 2019-01-24 PROCEDURE — 84100 ASSAY OF PHOSPHORUS: CPT

## 2019-01-24 PROCEDURE — 93306 TTE W/DOPPLER COMPLETE: CPT

## 2019-01-24 PROCEDURE — 96374 THER/PROPH/DIAG INJ IV PUSH: CPT

## 2019-01-24 PROCEDURE — 83935 ASSAY OF URINE OSMOLALITY: CPT

## 2019-01-24 PROCEDURE — 94640 AIRWAY INHALATION TREATMENT: CPT

## 2019-01-24 PROCEDURE — 87081 CULTURE SCREEN ONLY: CPT

## 2019-01-24 PROCEDURE — 97162 PT EVAL MOD COMPLEX 30 MIN: CPT

## 2019-01-24 PROCEDURE — 97161 PT EVAL LOW COMPLEX 20 MIN: CPT

## 2019-01-24 PROCEDURE — 81003 URINALYSIS AUTO W/O SCOPE: CPT

## 2019-01-24 PROCEDURE — 84300 ASSAY OF URINE SODIUM: CPT

## 2019-01-24 PROCEDURE — 83880 ASSAY OF NATRIURETIC PEPTIDE: CPT

## 2019-01-24 PROCEDURE — 94664 DEMO&/EVAL PT USE INHALER: CPT

## 2019-01-24 PROCEDURE — 70450 CT HEAD/BRAIN W/O DYE: CPT

## 2019-01-24 PROCEDURE — 87400 INFLUENZA A/B EACH AG IA: CPT

## 2019-01-24 PROCEDURE — 81001 URINALYSIS AUTO W/SCOPE: CPT

## 2019-01-24 PROCEDURE — 82803 BLOOD GASES ANY COMBINATION: CPT

## 2019-01-24 PROCEDURE — 36600 WITHDRAWAL OF ARTERIAL BLOOD: CPT

## 2019-01-24 PROCEDURE — 83605 ASSAY OF LACTIC ACID: CPT

## 2019-01-24 PROCEDURE — 87040 BLOOD CULTURE FOR BACTERIA: CPT

## 2019-01-24 PROCEDURE — 82043 UR ALBUMIN QUANTITATIVE: CPT

## 2019-01-24 PROCEDURE — 74176 CT ABD & PELVIS W/O CONTRAST: CPT

## 2019-01-24 PROCEDURE — 96375 TX/PRO/DX INJ NEW DRUG ADDON: CPT

## 2019-01-24 PROCEDURE — 80048 BASIC METABOLIC PNL TOTAL CA: CPT

## 2019-01-24 PROCEDURE — 84443 ASSAY THYROID STIM HORMONE: CPT

## 2019-01-24 PROCEDURE — 71250 CT THORAX DX C-: CPT

## 2019-01-24 PROCEDURE — 99291 CRITICAL CARE FIRST HOUR: CPT

## 2019-01-24 PROCEDURE — 36415 COLL VENOUS BLD VENIPUNCTURE: CPT

## 2019-01-24 RX ADMIN — DEXAMETHASONE SODIUM PHOSPHATE 1 MG: 10 INJECTION, SOLUTION INTRAMUSCULAR; INTRAVENOUS at 16:04

## 2019-01-24 RX ADMIN — THIAMINE HYDROCHLORIDE 1 MLS/HR: 100 INJECTION, SOLUTION INTRAMUSCULAR; INTRAVENOUS at 17:23

## 2019-01-24 RX ADMIN — ALBUTEROL SULFATE 1 MG: 2.5 SOLUTION RESPIRATORY (INHALATION) at 21:02

## 2019-01-24 RX ADMIN — GUAIFENESIN AND CODEINE PHOSPHATE 1 ML: 100; 10 SOLUTION ORAL at 19:43

## 2019-01-24 RX ADMIN — GABAPENTIN 1 MG: 300 CAPSULE ORAL at 20:30

## 2019-01-24 RX ADMIN — PRIMIDONE 1 MG: 250 TABLET ORAL at 20:29

## 2019-01-24 RX ADMIN — ALBUTEROL SULFATE 1 MG: 2.5 SOLUTION RESPIRATORY (INHALATION) at 15:43

## 2019-01-24 RX ADMIN — ALBUTEROL SULFATE 1 MG: 2.5 SOLUTION RESPIRATORY (INHALATION) at 17:56

## 2019-01-24 RX ADMIN — LEVOFLOXACIN 1 MLS/HR: 500 INJECTION, SOLUTION INTRAVENOUS at 16:04

## 2019-01-24 RX ADMIN — HYDROCODONE BITARTRATE AND ACETAMINOPHEN 1 TAB: 10; 325 TABLET ORAL at 22:03

## 2019-01-24 RX ADMIN — ALPRAZOLAM 1 MG: 0.5 TABLET ORAL at 20:33

## 2019-01-24 RX ADMIN — ONDANSETRON HYDROCHLORIDE 1 MG: 2 INJECTION, SOLUTION INTRAMUSCULAR; INTRAVENOUS at 16:04

## 2019-01-24 RX ADMIN — THIAMINE HYDROCHLORIDE 1 MLS/HR: 100 INJECTION, SOLUTION INTRAMUSCULAR; INTRAVENOUS at 16:05

## 2019-01-24 RX ADMIN — AZTREONAM 1 MLS/HR: 1 INJECTION, POWDER, LYOPHILIZED, FOR SOLUTION INTRAMUSCULAR; INTRAVENOUS at 17:23

## 2019-01-25 LAB
ADD MAN DIFF?: YES
ANION GAP: 12 (ref 5–13)
BAND NEUTROPHILS #M: 1.1 10^3/UL (ref 0–0.6)
BAND NEUTROPHILS % (M): 11 % (ref 0–4)
BLOOD UREA NITROGEN: 9 MG/DL (ref 7–20)
CALCIUM: 8.6 MG/DL (ref 8.4–10.2)
CARBON DIOXIDE: 23 MMOL/L (ref 21–31)
CHLORIDE: 104 MMOL/L (ref 97–110)
CREATININE: 0.65 MG/DL (ref 0.44–1)
GLUCOSE: 98 MG/DL (ref 70–220)
HEMATOCRIT: 27.2 % (ref 37–47)
HEMOGLOBIN: 8.9 G/DL (ref 12–16)
IMMATURE GRANS #M: 0.07 10^3/UL (ref 0–0.03)
IMMATURE GRANS % (M): 0.7 % (ref 0–0.43)
LYMPHOCYTES #M: 1.6 10^3/UL (ref 0.8–2.9)
LYMPHOCYTES % (M): 16 % (ref 15–51)
MAGNESIUM: 1.9 MG/DL (ref 1.7–2.5)
MEAN CORPUSCULAR HEMOGLOBIN: 28.3 PG (ref 29–33)
MEAN CORPUSCULAR HGB CONC: 32.7 G/DL (ref 32–37)
MEAN CORPUSCULAR VOLUME: 86.6 FL (ref 82–101)
MEAN PLATELET VOLUME: 10.4 FL (ref 7.4–10.4)
MONOCYTE #M: 0.5 10^3/UL (ref 0.3–0.9)
MONOCYTES % (M): 5 % (ref 0–11)
NUCLEATED RED BLOOD CELLS%: 0 /100WBC (ref 0–0)
PHOSPHORUS: 4.1 MG/DL (ref 2.5–4.9)
PLATELET COUNT: 258 10^3/UL (ref 140–415)
PLATELET ESTIMATE: NORMAL
POSITIVE DIFF: (no result)
POTASSIUM: 5.1 MMOL/L (ref 3.5–5.1)
REACTIVE LYMPHOCYTES #M: 0.1 10^3/UL (ref 0–0)
REACTIVE LYMPHOCYTES% (M): 1 % (ref 0–0)
RED BLOOD COUNT: 3.14 10^6/UL (ref 4.2–5.4)
RED CELL DISTRIBUTION WIDTH: 17.2 % (ref 11.5–14.5)
SEG NEUT #M: 7.2 10^3/UL (ref 1.6–7.5)
SEGMENTED NEUTROPHILS (M) %: 67 % (ref 39–77)
SMUDGE%M: 31 % (ref 0–0)
SODIUM: 139 MMOL/L (ref 135–144)
THYROID STIMULATING HORMONE: 3.09 MIU/L (ref 0.47–4.68)
WHITE BLOOD COUNT: 10.5 10^3/UL (ref 4.8–10.8)

## 2019-01-25 RX ADMIN — ALBUTEROL SULFATE 1 MG: 2.5 SOLUTION RESPIRATORY (INHALATION) at 08:00

## 2019-01-25 RX ADMIN — LAMOTRIGINE 1 MG: 100 TABLET ORAL at 08:11

## 2019-01-25 RX ADMIN — ALBUTEROL SULFATE 1 MG: 2.5 SOLUTION RESPIRATORY (INHALATION) at 14:00

## 2019-01-25 RX ADMIN — BUTALBITAL, ACETAMINOPHEN, AND CAFFEINE 1 TAB: 50; 325; 40 TABLET ORAL at 10:35

## 2019-01-25 RX ADMIN — METHYLPREDNISOLONE SODIUM SUCCINATE 1 MG: 40 INJECTION, POWDER, FOR SOLUTION INTRAMUSCULAR; INTRAVENOUS at 08:10

## 2019-01-25 RX ADMIN — METHYLPREDNISOLONE SODIUM SUCCINATE 1 MG: 40 INJECTION, POWDER, FOR SOLUTION INTRAMUSCULAR; INTRAVENOUS at 20:02

## 2019-01-25 RX ADMIN — IPRATROPIUM BROMIDE AND ALBUTEROL SULFATE 1 ML: .5; 3 SOLUTION RESPIRATORY (INHALATION) at 15:00

## 2019-01-25 RX ADMIN — GABAPENTIN 1 MG: 300 CAPSULE ORAL at 13:22

## 2019-01-25 RX ADMIN — PANTOPRAZOLE SODIUM 1 MG: 40 TABLET, DELAYED RELEASE ORAL at 05:52

## 2019-01-25 RX ADMIN — PRIMIDONE 1 MG: 250 TABLET ORAL at 20:02

## 2019-01-25 RX ADMIN — LORAZEPAM 1 MG: 2 INJECTION, SOLUTION INTRAMUSCULAR; INTRAVENOUS at 12:06

## 2019-01-25 RX ADMIN — GABAPENTIN 1 MG: 300 CAPSULE ORAL at 20:02

## 2019-01-25 RX ADMIN — GUAIFENESIN AND DEXTROMETHORPHAN HYDROBROMIDE 1 TAB: 600; 30 TABLET, EXTENDED RELEASE ORAL at 04:10

## 2019-01-25 RX ADMIN — BENZONATATE 1 MG: 100 CAPSULE ORAL at 18:28

## 2019-01-25 RX ADMIN — ALPRAZOLAM 1 MG: 0.5 TABLET ORAL at 04:33

## 2019-01-25 RX ADMIN — BUTALBITAL, ACETAMINOPHEN, AND CAFFEINE 1 TAB: 50; 325; 40 TABLET ORAL at 04:30

## 2019-01-25 RX ADMIN — IPRATROPIUM BROMIDE AND ALBUTEROL SULFATE 1 ML: .5; 3 SOLUTION RESPIRATORY (INHALATION) at 19:27

## 2019-01-25 RX ADMIN — PRIMIDONE 1 MG: 250 TABLET ORAL at 08:10

## 2019-01-25 RX ADMIN — PRIMIDONE 1 MG: 250 TABLET ORAL at 13:22

## 2019-01-25 RX ADMIN — HYDROCODONE BITARTRATE AND ACETAMINOPHEN 1 TAB: 10; 325 TABLET ORAL at 22:10

## 2019-01-25 RX ADMIN — LEVOFLOXACIN 1 MLS/HR: 500 INJECTION, SOLUTION INTRAVENOUS at 15:44

## 2019-01-25 RX ADMIN — LORAZEPAM 1 MG: 2 INJECTION, SOLUTION INTRAMUSCULAR; INTRAVENOUS at 20:01

## 2019-01-25 RX ADMIN — HYDROCODONE BITARTRATE AND ACETAMINOPHEN 1 TAB: 10; 325 TABLET ORAL at 08:51

## 2019-01-25 RX ADMIN — GUAIFENESIN AND CODEINE PHOSPHATE 1 ML: 100; 10 SOLUTION ORAL at 00:47

## 2019-01-25 RX ADMIN — GUAIFENESIN AND CODEINE PHOSPHATE 1 ML: 100; 10 SOLUTION ORAL at 14:44

## 2019-01-25 RX ADMIN — TOPIRAMATE 1 MG: 25 CAPSULE, COATED PELLETS ORAL at 08:10

## 2019-01-25 RX ADMIN — GUAIFENESIN AND DEXTROMETHORPHAN HYDROBROMIDE 1 TAB: 600; 30 TABLET, EXTENDED RELEASE ORAL at 08:10

## 2019-01-25 RX ADMIN — GABAPENTIN 1 MG: 300 CAPSULE ORAL at 08:10

## 2019-01-25 RX ADMIN — INDOMETHACIN 1 MG: 25 CAPSULE ORAL at 08:10

## 2019-01-25 RX ADMIN — GUAIFENESIN AND CODEINE PHOSPHATE 1 ML: 100; 10 SOLUTION ORAL at 20:01

## 2019-01-26 RX ADMIN — GABAPENTIN 1 MG: 300 CAPSULE ORAL at 20:22

## 2019-01-26 RX ADMIN — PRIMIDONE 1 MG: 250 TABLET ORAL at 20:22

## 2019-01-26 RX ADMIN — BENZONATATE 1 MG: 100 CAPSULE ORAL at 01:08

## 2019-01-26 RX ADMIN — INDOMETHACIN 1 MG: 25 CAPSULE ORAL at 08:57

## 2019-01-26 RX ADMIN — PRIMIDONE 1 MG: 250 TABLET ORAL at 12:37

## 2019-01-26 RX ADMIN — IPRATROPIUM BROMIDE AND ALBUTEROL SULFATE 1 ML: .5; 3 SOLUTION RESPIRATORY (INHALATION) at 16:57

## 2019-01-26 RX ADMIN — GABAPENTIN 1 MG: 300 CAPSULE ORAL at 08:56

## 2019-01-26 RX ADMIN — HYDROCODONE BITARTRATE AND ACETAMINOPHEN 1 TAB: 10; 325 TABLET ORAL at 21:26

## 2019-01-26 RX ADMIN — METHYLPREDNISOLONE SODIUM SUCCINATE 1 MG: 40 INJECTION, POWDER, FOR SOLUTION INTRAMUSCULAR; INTRAVENOUS at 08:57

## 2019-01-26 RX ADMIN — ALPRAZOLAM 1 MG: 0.5 TABLET ORAL at 01:09

## 2019-01-26 RX ADMIN — BUTALBITAL, ACETAMINOPHEN, AND CAFFEINE 1 TAB: 50; 325; 40 TABLET ORAL at 17:06

## 2019-01-26 RX ADMIN — GUAIFENESIN AND CODEINE PHOSPHATE 1 ML: 100; 10 SOLUTION ORAL at 05:27

## 2019-01-26 RX ADMIN — IPRATROPIUM BROMIDE AND ALBUTEROL SULFATE 1 ML: .5; 3 SOLUTION RESPIRATORY (INHALATION) at 20:08

## 2019-01-26 RX ADMIN — PRIMIDONE 1 MG: 250 TABLET ORAL at 08:57

## 2019-01-26 RX ADMIN — LAMOTRIGINE 1 MG: 100 TABLET ORAL at 08:57

## 2019-01-26 RX ADMIN — ACETAMINOPHEN 1 MG: 325 TABLET, FILM COATED ORAL at 01:08

## 2019-01-26 RX ADMIN — TOPIRAMATE 1 MG: 25 CAPSULE, COATED PELLETS ORAL at 08:57

## 2019-01-26 RX ADMIN — HYDROCODONE BITARTRATE AND ACETAMINOPHEN 1 TAB: 10; 325 TABLET ORAL at 09:22

## 2019-01-26 RX ADMIN — PANTOPRAZOLE SODIUM 1 MG: 40 TABLET, DELAYED RELEASE ORAL at 05:27

## 2019-01-26 RX ADMIN — GUAIFENESIN AND CODEINE PHOSPHATE 1 ML: 100; 10 SOLUTION ORAL at 15:20

## 2019-01-26 RX ADMIN — LEVOFLOXACIN 1 MLS/HR: 500 INJECTION, SOLUTION INTRAVENOUS at 15:19

## 2019-01-26 RX ADMIN — LORAZEPAM 1 MG: 2 INJECTION, SOLUTION INTRAMUSCULAR; INTRAVENOUS at 10:51

## 2019-01-26 RX ADMIN — GABAPENTIN 1 MG: 300 CAPSULE ORAL at 12:37

## 2019-01-26 RX ADMIN — METHYLPREDNISOLONE SODIUM SUCCINATE 1 MG: 40 INJECTION, POWDER, FOR SOLUTION INTRAMUSCULAR; INTRAVENOUS at 20:22

## 2019-01-26 RX ADMIN — GUAIFENESIN AND CODEINE PHOSPHATE 1 ML: 100; 10 SOLUTION ORAL at 20:21

## 2019-01-26 RX ADMIN — LORAZEPAM 1 MG: 2 INJECTION, SOLUTION INTRAMUSCULAR; INTRAVENOUS at 18:27

## 2019-01-26 RX ADMIN — IPRATROPIUM BROMIDE AND ALBUTEROL SULFATE 1 ML: .5; 3 SOLUTION RESPIRATORY (INHALATION) at 07:37

## 2019-01-26 RX ADMIN — GUAIFENESIN AND CODEINE PHOSPHATE 1 ML: 100; 10 SOLUTION ORAL at 12:37

## 2019-01-27 LAB
ADD MAN DIFF?: NO
ALLEN TEST: (no result)
ARTERIAL BASE EXCESS: -1.8 MMOL/L (ref -3–3)
ARTERIAL BLOOD GAS OXYGEN SAT: 94.8 MMHG (ref 95–98)
ARTERIAL COHB: 0.6 % (ref 0–3)
ARTERIAL FRACTION OF OXYHGB: 93.9 % (ref 93–99)
ARTERIAL HCO3: 23.1 MMOL/L (ref 22–26)
ARTERIAL METHB: 0.3 % (ref 0–1.5)
ARTERIAL PCO2: 39.8 MMHG (ref 35–45)
BASOPHIL #: 0 10^3/UL (ref 0–0.1)
BASOPHILS %: 0.2 % (ref 0–2)
EOSINOPHILS #: 0 10^3/UL (ref 0–0.5)
EOSINOPHILS %: 0.3 % (ref 0–7)
FIO2: 69 %
HEMATOCRIT: 26.8 % (ref 37–47)
HEMOGLOBIN: 8.8 G/DL (ref 12–16)
IMMATURE GRANS #M: 0.32 10^3/UL (ref 0–0.03)
IMMATURE GRANS % (M): 2.2 % (ref 0–0.43)
LYMPHOCYTES #: 1.7 10^3/UL (ref 0.8–2.9)
LYMPHOCYTES %: 11.9 % (ref 15–51)
MEAN CORPUSCULAR HEMOGLOBIN: 28 PG (ref 29–33)
MEAN CORPUSCULAR HGB CONC: 32.8 G/DL (ref 32–37)
MEAN CORPUSCULAR VOLUME: 85.4 FL (ref 82–101)
MEAN PLATELET VOLUME: 10.1 FL (ref 7.4–10.4)
MODE: (no result)
MONOCYTE #: 0.8 10^3/UL (ref 0.3–0.9)
MONOCYTES %: 5.7 % (ref 0–11)
NEUTROPHIL #: 11.6 10^3/UL (ref 1.6–7.5)
NEUTROPHILS %: 79.7 % (ref 39–77)
NUCLEATED RED BLOOD CELLS #: 0 10^3/UL (ref 0–0)
NUCLEATED RED BLOOD CELLS%: 0 /100WBC (ref 0–0)
O2 A-A PPRESDIFF RESPIRATORY: 374.8 MMHG (ref 7–24)
PLATELET COUNT: 267 10^3/UL (ref 140–415)
RED BLOOD COUNT: 3.14 10^6/UL (ref 4.2–5.4)
RED CELL DISTRIBUTION WIDTH: 17.2 % (ref 11.5–14.5)
WHITE BLOOD COUNT: 14.5 10^3/UL (ref 4.8–10.8)

## 2019-01-27 RX ADMIN — METHYLPREDNISOLONE SODIUM SUCCINATE 1 MG: 40 INJECTION, POWDER, FOR SOLUTION INTRAMUSCULAR; INTRAVENOUS at 08:50

## 2019-01-27 RX ADMIN — IPRATROPIUM BROMIDE AND ALBUTEROL SULFATE 1 ML: .5; 3 SOLUTION RESPIRATORY (INHALATION) at 08:07

## 2019-01-27 RX ADMIN — GUAIFENESIN AND CODEINE PHOSPHATE 1 ML: 100; 10 SOLUTION ORAL at 09:03

## 2019-01-27 RX ADMIN — PANTOPRAZOLE SODIUM 1 MG: 40 TABLET, DELAYED RELEASE ORAL at 05:48

## 2019-01-27 RX ADMIN — IPRATROPIUM BROMIDE AND ALBUTEROL SULFATE 1 ML: .5; 3 SOLUTION RESPIRATORY (INHALATION) at 12:54

## 2019-01-27 RX ADMIN — PRIMIDONE 1 MG: 250 TABLET ORAL at 08:50

## 2019-01-27 RX ADMIN — INDOMETHACIN 1 MG: 25 CAPSULE ORAL at 08:50

## 2019-01-27 RX ADMIN — GUAIFENESIN AND CODEINE PHOSPHATE 1 ML: 100; 10 SOLUTION ORAL at 16:34

## 2019-01-27 RX ADMIN — METHYLPREDNISOLONE SODIUM SUCCINATE 1 MG: 40 INJECTION, POWDER, FOR SOLUTION INTRAMUSCULAR; INTRAVENOUS at 20:56

## 2019-01-27 RX ADMIN — HYDROCODONE BITARTRATE AND ACETAMINOPHEN 1 TAB: 10; 325 TABLET ORAL at 15:57

## 2019-01-27 RX ADMIN — ALPRAZOLAM 1 MG: 0.5 TABLET ORAL at 17:25

## 2019-01-27 RX ADMIN — LEVOFLOXACIN 1 MG: 500 TABLET, FILM COATED ORAL at 05:48

## 2019-01-27 RX ADMIN — LORAZEPAM 1 MG: 2 INJECTION, SOLUTION INTRAMUSCULAR; INTRAVENOUS at 00:58

## 2019-01-27 RX ADMIN — LORAZEPAM 1 MG: 2 INJECTION, SOLUTION INTRAMUSCULAR; INTRAVENOUS at 23:49

## 2019-01-27 RX ADMIN — LAMOTRIGINE 1 MG: 100 TABLET ORAL at 08:50

## 2019-01-27 RX ADMIN — HYDROCODONE BITARTRATE AND ACETAMINOPHEN 1 TAB: 10; 325 TABLET ORAL at 05:48

## 2019-01-27 RX ADMIN — IPRATROPIUM BROMIDE AND ALBUTEROL SULFATE 1 ML: .5; 3 SOLUTION RESPIRATORY (INHALATION) at 00:22

## 2019-01-27 RX ADMIN — GUAIFENESIN AND CODEINE PHOSPHATE 1 ML: 100; 10 SOLUTION ORAL at 01:05

## 2019-01-27 RX ADMIN — GABAPENTIN 1 MG: 300 CAPSULE ORAL at 20:56

## 2019-01-27 RX ADMIN — GUAIFENESIN AND CODEINE PHOSPHATE 1 ML: 100; 10 SOLUTION ORAL at 05:49

## 2019-01-27 RX ADMIN — IPRATROPIUM BROMIDE AND ALBUTEROL SULFATE 1 ML: .5; 3 SOLUTION RESPIRATORY (INHALATION) at 05:00

## 2019-01-27 RX ADMIN — PRIMIDONE 1 MG: 250 TABLET ORAL at 12:37

## 2019-01-27 RX ADMIN — GUAIFENESIN AND CODEINE PHOSPHATE 1 ML: 100; 10 SOLUTION ORAL at 20:57

## 2019-01-27 RX ADMIN — PRIMIDONE 1 MG: 250 TABLET ORAL at 20:56

## 2019-01-27 RX ADMIN — GUAIFENESIN AND CODEINE PHOSPHATE 1 ML: 100; 10 SOLUTION ORAL at 23:48

## 2019-01-27 RX ADMIN — LORAZEPAM 1 MG: 2 INJECTION, SOLUTION INTRAMUSCULAR; INTRAVENOUS at 12:09

## 2019-01-27 RX ADMIN — TOPIRAMATE 1 MG: 25 CAPSULE, COATED PELLETS ORAL at 08:49

## 2019-01-27 RX ADMIN — ALPRAZOLAM 1 MG: 0.5 TABLET ORAL at 02:54

## 2019-01-27 RX ADMIN — IPRATROPIUM BROMIDE AND ALBUTEROL SULFATE 1 ML: .5; 3 SOLUTION RESPIRATORY (INHALATION) at 20:02

## 2019-01-27 RX ADMIN — MIRTAZAPINE 1 MG: 15 TABLET, FILM COATED ORAL at 20:56

## 2019-01-27 RX ADMIN — GABAPENTIN 1 MG: 300 CAPSULE ORAL at 12:37

## 2019-01-27 RX ADMIN — GABAPENTIN 1 MG: 300 CAPSULE ORAL at 08:50

## 2019-01-27 RX ADMIN — IPRATROPIUM BROMIDE AND ALBUTEROL SULFATE 1 ML: .5; 3 SOLUTION RESPIRATORY (INHALATION) at 16:48

## 2019-01-28 LAB
ADD MAN DIFF?: NO
ADD UMIC: YES
ALBUMIN: 2.4 G/DL (ref 3.3–4.9)
ALBUMIN: 2.7 G/DL (ref 3.3–4.9)
ANION GAP: 7 (ref 5–13)
ANION GAP: 7 (ref 5–13)
ANION GAP: 9 (ref 5–13)
BASOPHIL #: 0 10^3/UL (ref 0–0.1)
BASOPHILS %: 0.2 % (ref 0–2)
BLOOD UREA NITROGEN: 10 MG/DL (ref 7–20)
BLOOD UREA NITROGEN: 11 MG/DL (ref 7–20)
BLOOD UREA NITROGEN: 11 MG/DL (ref 7–20)
CALCIUM: 7.9 MG/DL (ref 8.4–10.2)
CALCIUM: 8.1 MG/DL (ref 8.4–10.2)
CALCIUM: 8.2 MG/DL (ref 8.4–10.2)
CARBON DIOXIDE: 24 MMOL/L (ref 21–31)
CARBON DIOXIDE: 24 MMOL/L (ref 21–31)
CARBON DIOXIDE: 25 MMOL/L (ref 21–31)
CHLORIDE: 94 MMOL/L (ref 97–110)
CHLORIDE: 95 MMOL/L (ref 97–110)
CHLORIDE: 96 MMOL/L (ref 97–110)
CREATININE,URINE RANDOM: 48.52 MG/DL (ref 20–320)
CREATININE: 0.64 MG/DL (ref 0.44–1)
CREATININE: 0.64 MG/DL (ref 0.44–1)
CREATININE: 0.75 MG/DL (ref 0.44–1)
EOSINOPHILS #: 0.1 10^3/UL (ref 0–0.5)
EOSINOPHILS %: 0.6 % (ref 0–7)
GLUCOSE: 101 MG/DL (ref 70–220)
GLUCOSE: 104 MG/DL (ref 70–220)
GLUCOSE: 81 MG/DL (ref 70–220)
HEMATOCRIT: 22.8 % (ref 37–47)
HEMOGLOBIN: 7.6 G/DL (ref 12–16)
IMMATURE GRANS #M: 0.22 10^3/UL (ref 0–0.03)
IMMATURE GRANS % (M): 1.7 % (ref 0–0.43)
LYMPHOCYTES #: 1.4 10^3/UL (ref 0.8–2.9)
LYMPHOCYTES %: 10.9 % (ref 15–51)
MAGNESIUM: 1.6 MG/DL (ref 1.7–2.5)
MEAN CORPUSCULAR HEMOGLOBIN: 27.7 PG (ref 29–33)
MEAN CORPUSCULAR HGB CONC: 33.3 G/DL (ref 32–37)
MEAN CORPUSCULAR VOLUME: 83.2 FL (ref 82–101)
MEAN PLATELET VOLUME: 9.3 FL (ref 7.4–10.4)
MONOCYTE #: 0.6 10^3/UL (ref 0.3–0.9)
MONOCYTES %: 4.6 % (ref 0–11)
NEUTROPHIL #: 10.4 10^3/UL (ref 1.6–7.5)
NEUTROPHILS %: 82 % (ref 39–77)
NUCLEATED RED BLOOD CELLS #: 0 10^3/UL (ref 0–0)
NUCLEATED RED BLOOD CELLS%: 0 /100WBC (ref 0–0)
OSMOLALITY,URINE: 207 MOSM/KG (ref 250–1200)
PHOSPHORUS: 4.4 MG/DL (ref 2.5–4.9)
PHOSPHORUS: 4.6 MG/DL (ref 2.5–4.9)
PLATELET COUNT: 262 10^3/UL (ref 140–415)
POTASSIUM: 5.1 MMOL/L (ref 3.5–5.1)
POTASSIUM: 5.2 MMOL/L (ref 3.5–5.1)
POTASSIUM: 5.2 MMOL/L (ref 3.5–5.1)
PROTEIN URINE: 22 MG/DL (ref 0–11.9)
RED BLOOD COUNT: 2.74 10^6/UL (ref 4.2–5.4)
RED CELL DISTRIBUTION WIDTH: 17 % (ref 11.5–14.5)
SODIUM,URINE RANDOM: 14 MMOL/L (ref 30–90)
SODIUM: 126 MMOL/L (ref 135–144)
SODIUM: 127 MMOL/L (ref 135–144)
SODIUM: 128 MMOL/L (ref 135–144)
UR ASCORBIC ACID: NEGATIVE MG/DL
UR BACTERIA: (no result) /HPF
UR BILIRUBIN (DIP): NEGATIVE MG/DL
UR BLOOD (DIP): (no result) MG/DL
UR CLARITY: CLEAR
UR COLOR: YELLOW
UR GLUCOSE (DIP): NEGATIVE MG/DL
UR KETONES (DIP): NEGATIVE MG/DL
UR LEUKOCYTE ESTERASE (DIP): NEGATIVE LEU/UL
UR NITRITE (DIP): NEGATIVE MG/DL
UR PH (DIP): 6 (ref 5–9)
UR RBC: 1 /HPF (ref 0–5)
UR SPECIFIC GRAVITY (DIP): 1.01 (ref 1–1.03)
UR SQUAMOUS EPITHELIAL CELL: (no result) /HPF
UR TOTAL PROTEIN (DIP): NEGATIVE MG/DL
UR UROBILINOGEN (DIP): NEGATIVE MG/DL
UR WBC: 2 /HPF (ref 0–5)
WHITE BLOOD COUNT: 12.7 10^3/UL (ref 4.8–10.8)

## 2019-01-28 RX ADMIN — ALPRAZOLAM 1 MG: 0.5 TABLET ORAL at 21:04

## 2019-01-28 RX ADMIN — MAGNESIUM OXIDE TAB 400 MG (241.3 MG ELEMENTAL MG) 1 MG: 400 (241.3 MG) TAB at 16:27

## 2019-01-28 RX ADMIN — GUAIFENESIN AND DEXTROMETHORPHAN HYDROBROMIDE 1 TAB: 600; 30 TABLET, EXTENDED RELEASE ORAL at 00:50

## 2019-01-28 RX ADMIN — GABAPENTIN 1 MG: 300 CAPSULE ORAL at 09:27

## 2019-01-28 RX ADMIN — MEROPENEM 1 MLS/HR: 1 INJECTION, POWDER, FOR SOLUTION INTRAVENOUS at 16:27

## 2019-01-28 RX ADMIN — INDOMETHACIN 1 MG: 25 CAPSULE ORAL at 09:27

## 2019-01-28 RX ADMIN — IPRATROPIUM BROMIDE AND ALBUTEROL SULFATE 1 ML: .5; 3 SOLUTION RESPIRATORY (INHALATION) at 20:15

## 2019-01-28 RX ADMIN — CASTOR OIL AND BALSAM, PERU 1 APPLIC: 788; 87 OINTMENT TOPICAL at 20:52

## 2019-01-28 RX ADMIN — THIAMINE HYDROCHLORIDE 1 MLS/HR: 100 INJECTION, SOLUTION INTRAMUSCULAR; INTRAVENOUS at 16:27

## 2019-01-28 RX ADMIN — GUAIFENESIN AND CODEINE PHOSPHATE 1 ML: 100; 10 SOLUTION ORAL at 06:34

## 2019-01-28 RX ADMIN — METHYLPREDNISOLONE SODIUM SUCCINATE 1 MG: 40 INJECTION, POWDER, FOR SOLUTION INTRAMUSCULAR; INTRAVENOUS at 18:08

## 2019-01-28 RX ADMIN — LEVOFLOXACIN 1 MLS/HR: 5 INJECTION, SOLUTION INTRAVENOUS at 09:27

## 2019-01-28 RX ADMIN — ONDANSETRON HYDROCHLORIDE 1 MG: 2 INJECTION, SOLUTION INTRAMUSCULAR; INTRAVENOUS at 13:11

## 2019-01-28 RX ADMIN — OSELTAMIVIR PHOSPHATE 1 MG: 75 CAPSULE ORAL at 09:27

## 2019-01-28 RX ADMIN — METHYLPREDNISOLONE SODIUM SUCCINATE 1 MG: 40 INJECTION, POWDER, FOR SOLUTION INTRAMUSCULAR; INTRAVENOUS at 12:46

## 2019-01-28 RX ADMIN — LORAZEPAM 1 MG: 2 INJECTION, SOLUTION INTRAMUSCULAR; INTRAVENOUS at 18:04

## 2019-01-28 RX ADMIN — OSELTAMIVIR PHOSPHATE 1 MG: 75 CAPSULE ORAL at 20:52

## 2019-01-28 RX ADMIN — IPRATROPIUM BROMIDE AND ALBUTEROL SULFATE 1 ML: .5; 3 SOLUTION RESPIRATORY (INHALATION) at 12:16

## 2019-01-28 RX ADMIN — AZTREONAM 1 MLS/HR: 1 INJECTION, POWDER, LYOPHILIZED, FOR SOLUTION INTRAMUSCULAR; INTRAVENOUS at 12:46

## 2019-01-28 RX ADMIN — PRIMIDONE 1 MG: 250 TABLET ORAL at 12:46

## 2019-01-28 RX ADMIN — GUAIFENESIN AND DEXTROMETHORPHAN HYDROBROMIDE 1 TAB: 600; 30 TABLET, EXTENDED RELEASE ORAL at 22:42

## 2019-01-28 RX ADMIN — GABAPENTIN 1 MG: 300 CAPSULE ORAL at 20:52

## 2019-01-28 RX ADMIN — HYDROCODONE BITARTRATE AND ACETAMINOPHEN 1 TAB: 10; 325 TABLET ORAL at 00:50

## 2019-01-28 RX ADMIN — IPRATROPIUM BROMIDE AND ALBUTEROL SULFATE 1 ML: .5; 3 SOLUTION RESPIRATORY (INHALATION) at 09:14

## 2019-01-28 RX ADMIN — LAMOTRIGINE 1 MG: 100 TABLET ORAL at 09:27

## 2019-01-28 RX ADMIN — TOPIRAMATE 1 MG: 25 CAPSULE, COATED PELLETS ORAL at 09:27

## 2019-01-28 RX ADMIN — LORAZEPAM 1 MG: 2 INJECTION, SOLUTION INTRAMUSCULAR; INTRAVENOUS at 09:36

## 2019-01-28 RX ADMIN — LORAZEPAM 1 MG: 2 INJECTION, SOLUTION INTRAMUSCULAR; INTRAVENOUS at 22:42

## 2019-01-28 RX ADMIN — PRIMIDONE 1 MG: 250 TABLET ORAL at 09:27

## 2019-01-28 RX ADMIN — LEVOFLOXACIN 1 MG: 500 TABLET, FILM COATED ORAL at 06:34

## 2019-01-28 RX ADMIN — GABAPENTIN 1 MG: 300 CAPSULE ORAL at 12:46

## 2019-01-28 RX ADMIN — PRIMIDONE 1 MG: 250 TABLET ORAL at 20:52

## 2019-01-28 RX ADMIN — GUAIFENESIN AND CODEINE PHOSPHATE 1 ML: 100; 10 SOLUTION ORAL at 21:02

## 2019-01-28 RX ADMIN — IPRATROPIUM BROMIDE AND ALBUTEROL SULFATE 1 ML: .5; 3 SOLUTION RESPIRATORY (INHALATION) at 16:36

## 2019-01-28 RX ADMIN — PANTOPRAZOLE SODIUM 1 MG: 40 TABLET, DELAYED RELEASE ORAL at 06:34

## 2019-01-28 RX ADMIN — HYDROCODONE BITARTRATE AND ACETAMINOPHEN 1 TAB: 10; 325 TABLET ORAL at 21:14

## 2019-01-28 RX ADMIN — MIRTAZAPINE 1 MG: 15 TABLET, FILM COATED ORAL at 20:52

## 2019-01-29 LAB
ABNORMAL IP MESSAGE: 1
ADD MAN DIFF?: NO
ALLEN TEST: (no result)
ANION GAP: 12 (ref 5–13)
ARTERIAL BASE EXCESS: -1.3 MMOL/L (ref -3–3)
ARTERIAL BLOOD GAS OXYGEN SAT: 98 MMHG (ref 95–98)
ARTERIAL COHB: 0.2 % (ref 0–3)
ARTERIAL FRACTION OF OXYHGB: 97.5 % (ref 93–99)
ARTERIAL HCO3: 23.3 MMOL/L (ref 22–26)
ARTERIAL METHB: 0.3 % (ref 0–1.5)
ARTERIAL PCO2: 38.6 MMHG (ref 35–45)
BASOPHIL #: 0 10^3/UL (ref 0–0.1)
BASOPHILS %: 0.2 % (ref 0–2)
BLOOD UREA NITROGEN: 12 MG/DL (ref 7–20)
CALCIUM: 8.4 MG/DL (ref 8.4–10.2)
CARBON DIOXIDE: 27 MMOL/L (ref 21–31)
CHLORIDE: 94 MMOL/L (ref 97–110)
CREATININE: 0.67 MG/DL (ref 0.44–1)
EOSINOPHILS #: 0 10^3/UL (ref 0–0.5)
EOSINOPHILS %: 0 % (ref 0–7)
FIO2: 80 %
GLUCOSE: 104 MG/DL (ref 70–220)
HEMATOCRIT: 25.3 % (ref 37–47)
HEMOGLOBIN: 8.5 G/DL (ref 12–16)
IMMATURE GRANS #M: 0.21 10^3/UL (ref 0–0.03)
IMMATURE GRANS % (M): 1.5 % (ref 0–0.43)
LYMPHOCYTES #: 0.6 10^3/UL (ref 0.8–2.9)
LYMPHOCYTES %: 4.2 % (ref 15–51)
MAGNESIUM: 2 MG/DL (ref 1.7–2.5)
MEAN CORPUSCULAR HEMOGLOBIN: 28.1 PG (ref 29–33)
MEAN CORPUSCULAR HGB CONC: 33.6 G/DL (ref 32–37)
MEAN CORPUSCULAR VOLUME: 83.8 FL (ref 82–101)
MEAN PLATELET VOLUME: 9.1 FL (ref 7.4–10.4)
MODE: (no result)
MONOCYTE #: 0.4 10^3/UL (ref 0.3–0.9)
MONOCYTES %: 3.1 % (ref 0–11)
NEUTROPHIL #: 12.3 10^3/UL (ref 1.6–7.5)
NEUTROPHILS %: 91 % (ref 39–77)
NUCLEATED RED BLOOD CELLS #: 0 10^3/UL (ref 0–0)
NUCLEATED RED BLOOD CELLS%: 0 /100WBC (ref 0–0)
O2 A-A PPRESDIFF RESPIRATORY: 394.9 MMHG (ref 7–24)
PHOSPHORUS: 5 MG/DL (ref 2.5–4.9)
PLATELET COUNT: 322 10^3/UL (ref 140–415)
POSITIVE DIFF: (no result)
POTASSIUM: 5.1 MMOL/L (ref 3.5–5.1)
RED BLOOD COUNT: 3.02 10^6/UL (ref 4.2–5.4)
RED CELL DISTRIBUTION WIDTH: 16.9 % (ref 11.5–14.5)
SODIUM: 133 MMOL/L (ref 135–144)
WHITE BLOOD COUNT: 13.6 10^3/UL (ref 4.8–10.8)

## 2019-01-29 RX ADMIN — MEROPENEM 1 MLS/HR: 1 INJECTION, POWDER, FOR SOLUTION INTRAVENOUS at 09:06

## 2019-01-29 RX ADMIN — LORAZEPAM 1 MG: 2 INJECTION, SOLUTION INTRAMUSCULAR; INTRAVENOUS at 11:53

## 2019-01-29 RX ADMIN — MIRTAZAPINE 1 MG: 15 TABLET, FILM COATED ORAL at 20:29

## 2019-01-29 RX ADMIN — GUAIFENESIN AND CODEINE PHOSPHATE 1 ML: 100; 10 SOLUTION ORAL at 02:39

## 2019-01-29 RX ADMIN — PRIMIDONE 1 MG: 250 TABLET ORAL at 20:29

## 2019-01-29 RX ADMIN — PANTOPRAZOLE SODIUM 1 MG: 40 TABLET, DELAYED RELEASE ORAL at 05:22

## 2019-01-29 RX ADMIN — BUDESONIDE 1 MG: 0.5 SUSPENSION RESPIRATORY (INHALATION) at 20:00

## 2019-01-29 RX ADMIN — INDOMETHACIN 1 MG: 25 CAPSULE ORAL at 09:05

## 2019-01-29 RX ADMIN — PRIMIDONE 1 MG: 250 TABLET ORAL at 13:07

## 2019-01-29 RX ADMIN — LIDOCAINE HYDROCHLORIDE 1 MLS/HR: 10 INJECTION, SOLUTION EPIDURAL; INFILTRATION; INTRACAUDAL; PERINEURAL at 19:16

## 2019-01-29 RX ADMIN — PRIMIDONE 1 MG: 250 TABLET ORAL at 09:05

## 2019-01-29 RX ADMIN — OSELTAMIVIR PHOSPHATE 1 MG: 75 CAPSULE ORAL at 21:19

## 2019-01-29 RX ADMIN — METHYLPREDNISOLONE SODIUM SUCCINATE 1 MG: 40 INJECTION, POWDER, FOR SOLUTION INTRAMUSCULAR; INTRAVENOUS at 05:22

## 2019-01-29 RX ADMIN — MEROPENEM 1 MLS/HR: 1 INJECTION, POWDER, FOR SOLUTION INTRAVENOUS at 00:45

## 2019-01-29 RX ADMIN — IPRATROPIUM BROMIDE AND ALBUTEROL SULFATE 1 ML: .5; 3 SOLUTION RESPIRATORY (INHALATION) at 12:23

## 2019-01-29 RX ADMIN — LEVOFLOXACIN 1 MLS/HR: 5 INJECTION, SOLUTION INTRAVENOUS at 11:31

## 2019-01-29 RX ADMIN — GABAPENTIN 1 MG: 300 CAPSULE ORAL at 13:07

## 2019-01-29 RX ADMIN — CASTOR OIL AND BALSAM, PERU 1 APPLIC: 788; 87 OINTMENT TOPICAL at 09:04

## 2019-01-29 RX ADMIN — CASTOR OIL AND BALSAM, PERU 1 APPLIC: 788; 87 OINTMENT TOPICAL at 20:29

## 2019-01-29 RX ADMIN — HYDROCODONE BITARTRATE AND ACETAMINOPHEN 1 TAB: 10; 325 TABLET ORAL at 05:29

## 2019-01-29 RX ADMIN — IPRATROPIUM BROMIDE AND ALBUTEROL SULFATE 1 ML: .5; 3 SOLUTION RESPIRATORY (INHALATION) at 20:24

## 2019-01-29 RX ADMIN — MEROPENEM 1 MLS/HR: 1 INJECTION, POWDER, FOR SOLUTION INTRAVENOUS at 20:29

## 2019-01-29 RX ADMIN — GABAPENTIN 1 MG: 300 CAPSULE ORAL at 20:29

## 2019-01-29 RX ADMIN — METHYLPREDNISOLONE SODIUM SUCCINATE 1 MG: 40 INJECTION, POWDER, FOR SOLUTION INTRAMUSCULAR; INTRAVENOUS at 18:04

## 2019-01-29 RX ADMIN — LAMOTRIGINE 1 MG: 100 TABLET ORAL at 09:05

## 2019-01-29 RX ADMIN — HYDROCODONE BITARTRATE AND ACETAMINOPHEN 1 TAB: 10; 325 TABLET ORAL at 14:18

## 2019-01-29 RX ADMIN — OSELTAMIVIR PHOSPHATE 1 MG: 75 CAPSULE ORAL at 09:05

## 2019-01-29 RX ADMIN — ACETAMINOPHEN 1 MG: 325 TABLET, FILM COATED ORAL at 18:13

## 2019-01-29 RX ADMIN — GABAPENTIN 1 MG: 300 CAPSULE ORAL at 09:05

## 2019-01-29 RX ADMIN — TOPIRAMATE 1 MG: 25 CAPSULE, COATED PELLETS ORAL at 09:05

## 2019-01-29 RX ADMIN — LIDOCAINE HYDROCHLORIDE 1 MLS/HR: 10 INJECTION, SOLUTION EPIDURAL; INFILTRATION; INTRACAUDAL; PERINEURAL at 23:46

## 2019-01-29 RX ADMIN — METHYLPREDNISOLONE SODIUM SUCCINATE 1 MG: 40 INJECTION, POWDER, FOR SOLUTION INTRAMUSCULAR; INTRAVENOUS at 11:31

## 2019-01-29 RX ADMIN — METHYLPREDNISOLONE SODIUM SUCCINATE 1 MG: 40 INJECTION, POWDER, FOR SOLUTION INTRAMUSCULAR; INTRAVENOUS at 23:21

## 2019-01-29 RX ADMIN — BUTALBITAL, ACETAMINOPHEN, AND CAFFEINE 1 TAB: 50; 325; 40 TABLET ORAL at 09:05

## 2019-01-29 RX ADMIN — GUAIFENESIN AND CODEINE PHOSPHATE 1 ML: 100; 10 SOLUTION ORAL at 05:29

## 2019-01-29 RX ADMIN — BUDESONIDE 1 MG: 0.5 SUSPENSION RESPIRATORY (INHALATION) at 20:24

## 2019-01-29 RX ADMIN — METHYLPREDNISOLONE SODIUM SUCCINATE 1 MG: 40 INJECTION, POWDER, FOR SOLUTION INTRAMUSCULAR; INTRAVENOUS at 00:47

## 2019-01-29 RX ADMIN — GUAIFENESIN AND CODEINE PHOSPHATE 1 ML: 100; 10 SOLUTION ORAL at 11:53

## 2019-01-29 RX ADMIN — IPRATROPIUM BROMIDE AND ALBUTEROL SULFATE 1 ML: .5; 3 SOLUTION RESPIRATORY (INHALATION) at 16:17

## 2019-01-29 RX ADMIN — HALOPERIDOL LACTATE 1 MG: 5 INJECTION, SOLUTION INTRAMUSCULAR at 21:13

## 2019-01-29 RX ADMIN — LORAZEPAM 1 MG: 2 INJECTION, SOLUTION INTRAMUSCULAR; INTRAVENOUS at 20:20

## 2019-01-29 RX ADMIN — BENZONATATE 1 MG: 100 CAPSULE ORAL at 09:18

## 2019-01-29 RX ADMIN — IPRATROPIUM BROMIDE AND ALBUTEROL SULFATE 1 ML: .5; 3 SOLUTION RESPIRATORY (INHALATION) at 09:14

## 2019-01-29 RX ADMIN — THIAMINE HYDROCHLORIDE 1 MLS/HR: 100 INJECTION, SOLUTION INTRAMUSCULAR; INTRAVENOUS at 18:04

## 2019-01-29 RX ADMIN — GUAIFENESIN AND CODEINE PHOSPHATE 1 ML: 100; 10 SOLUTION ORAL at 20:20

## 2019-01-30 LAB
ADD MAN DIFF?: NO
ALLEN TEST: (no result)
ANION GAP: 12 (ref 5–13)
ARTERIAL BASE EXCESS: -0.4 MMOL/L (ref -3–3)
ARTERIAL BLOOD GAS OXYGEN SAT: 95.2 MMHG (ref 95–98)
ARTERIAL COHB: 0.7 % (ref 0–3)
ARTERIAL FRACTION OF OXYHGB: 94.2 % (ref 93–99)
ARTERIAL HCO3: 24.3 MMOL/L (ref 22–26)
ARTERIAL METHB: 0.3 % (ref 0–1.5)
ARTERIAL PCO2: 40.1 MMHG (ref 35–45)
B-TYPE NATRIURETIC PEPTIDE: 621 PG/ML (ref 0–125)
BASOPHIL #: 0 10^3/UL (ref 0–0.1)
BASOPHILS %: 0.2 % (ref 0–2)
BLOOD UREA NITROGEN: 11 MG/DL (ref 7–20)
CALCIUM: 8.3 MG/DL (ref 8.4–10.2)
CARBON DIOXIDE: 25 MMOL/L (ref 21–31)
CHLORIDE: 97 MMOL/L (ref 97–110)
CREATININE: 0.65 MG/DL (ref 0.44–1)
EOSINOPHILS #: 0 10^3/UL (ref 0–0.5)
EOSINOPHILS %: 0.2 % (ref 0–7)
FIO2: 60 %
GLUCOSE: 110 MG/DL (ref 70–220)
HEMATOCRIT: 22.4 % (ref 37–47)
HEMOGLOBIN: 7.7 G/DL (ref 12–16)
IMMATURE GRANS #M: 0.19 10^3/UL (ref 0–0.03)
IMMATURE GRANS % (M): 1.3 % (ref 0–0.43)
LYMPHOCYTES #: 1 10^3/UL (ref 0.8–2.9)
LYMPHOCYTES %: 7.2 % (ref 15–51)
MAGNESIUM: 2 MG/DL (ref 1.7–2.5)
MEAN CORPUSCULAR HEMOGLOBIN: 28.8 PG (ref 29–33)
MEAN CORPUSCULAR HGB CONC: 34.4 G/DL (ref 32–37)
MEAN CORPUSCULAR VOLUME: 83.9 FL (ref 82–101)
MEAN PLATELET VOLUME: 8.8 FL (ref 7.4–10.4)
MICROALBUMIN/CREATININE RATIO: (no result) (ref ?–30)
MICROALBUMIN: <0.2 MG/DL
MODE: (no result)
MONOCYTE #: 0.4 10^3/UL (ref 0.3–0.9)
MONOCYTES %: 2.6 % (ref 0–11)
NEUTROPHIL #: 12.5 10^3/UL (ref 1.6–7.5)
NEUTROPHILS %: 88.5 % (ref 39–77)
NUCLEATED RED BLOOD CELLS #: 0 10^3/UL (ref 0–0)
NUCLEATED RED BLOOD CELLS%: 0 /100WBC (ref 0–0)
O2 A-A PPRESDIFF RESPIRATORY: 303.4 MMHG (ref 7–24)
PHOSPHORUS: 3.8 MG/DL (ref 2.5–4.9)
PLATELET COUNT: 306 10^3/UL (ref 140–415)
POTASSIUM: 4.8 MMOL/L (ref 3.5–5.1)
RED BLOOD COUNT: 2.67 10^6/UL (ref 4.2–5.4)
RED CELL DISTRIBUTION WIDTH: 16.9 % (ref 11.5–14.5)
SODIUM: 134 MMOL/L (ref 135–144)
WHITE BLOOD COUNT: 14.1 10^3/UL (ref 4.8–10.8)

## 2019-01-30 RX ADMIN — LAMOTRIGINE 1 MG: 100 TABLET ORAL at 08:02

## 2019-01-30 RX ADMIN — GABAPENTIN 1 MG: 300 CAPSULE ORAL at 08:03

## 2019-01-30 RX ADMIN — METHYLPREDNISOLONE SODIUM SUCCINATE 1 MG: 40 INJECTION, POWDER, FOR SOLUTION INTRAMUSCULAR; INTRAVENOUS at 11:21

## 2019-01-30 RX ADMIN — NICOTINE POLACRILEX 1 MG: 2 GUM, CHEWING BUCCAL at 15:08

## 2019-01-30 RX ADMIN — BUDESONIDE 1 MG: 0.5 SUSPENSION RESPIRATORY (INHALATION) at 09:24

## 2019-01-30 RX ADMIN — METHYLPREDNISOLONE SODIUM SUCCINATE 1 MG: 40 INJECTION, POWDER, FOR SOLUTION INTRAMUSCULAR; INTRAVENOUS at 17:20

## 2019-01-30 RX ADMIN — BUDESONIDE 1 MG: 0.5 SUSPENSION RESPIRATORY (INHALATION) at 20:48

## 2019-01-30 RX ADMIN — BENZONATATE 1 MG: 100 CAPSULE ORAL at 15:04

## 2019-01-30 RX ADMIN — GUAIFENESIN AND CODEINE PHOSPHATE 1 ML: 100; 10 SOLUTION ORAL at 07:48

## 2019-01-30 RX ADMIN — THIAMINE HYDROCHLORIDE 1 MLS/HR: 100 INJECTION, SOLUTION INTRAMUSCULAR; INTRAVENOUS at 17:33

## 2019-01-30 RX ADMIN — BUTALBITAL, ACETAMINOPHEN, AND CAFFEINE 1 TAB: 50; 325; 40 TABLET ORAL at 17:20

## 2019-01-30 RX ADMIN — CASTOR OIL AND BALSAM, PERU 1 APPLIC: 788; 87 OINTMENT TOPICAL at 20:06

## 2019-01-30 RX ADMIN — BUTALBITAL, ACETAMINOPHEN, AND CAFFEINE 1 TAB: 50; 325; 40 TABLET ORAL at 08:34

## 2019-01-30 RX ADMIN — MEROPENEM 1 MLS/HR: 1 INJECTION, POWDER, FOR SOLUTION INTRAVENOUS at 20:07

## 2019-01-30 RX ADMIN — OSELTAMIVIR PHOSPHATE 1 MG: 75 CAPSULE ORAL at 08:03

## 2019-01-30 RX ADMIN — IPRATROPIUM BROMIDE AND ALBUTEROL SULFATE 1 ML: .5; 3 SOLUTION RESPIRATORY (INHALATION) at 14:16

## 2019-01-30 RX ADMIN — CASTOR OIL AND BALSAM, PERU 1 APPLIC: 788; 87 OINTMENT TOPICAL at 08:03

## 2019-01-30 RX ADMIN — TOPIRAMATE 1 MG: 25 CAPSULE, COATED PELLETS ORAL at 08:03

## 2019-01-30 RX ADMIN — METHYLPREDNISOLONE SODIUM SUCCINATE 1 MG: 40 INJECTION, POWDER, FOR SOLUTION INTRAMUSCULAR; INTRAVENOUS at 23:40

## 2019-01-30 RX ADMIN — PRIMIDONE 1 MG: 250 TABLET ORAL at 12:47

## 2019-01-30 RX ADMIN — GABAPENTIN 1 MG: 300 CAPSULE ORAL at 12:47

## 2019-01-30 RX ADMIN — IPRATROPIUM BROMIDE AND ALBUTEROL SULFATE 1 ML: .5; 3 SOLUTION RESPIRATORY (INHALATION) at 20:48

## 2019-01-30 RX ADMIN — METHYLPREDNISOLONE SODIUM SUCCINATE 1 MG: 40 INJECTION, POWDER, FOR SOLUTION INTRAMUSCULAR; INTRAVENOUS at 05:14

## 2019-01-30 RX ADMIN — LEVOFLOXACIN 1 MLS/HR: 5 INJECTION, SOLUTION INTRAVENOUS at 10:38

## 2019-01-30 RX ADMIN — PRIMIDONE 1 MG: 250 TABLET ORAL at 20:07

## 2019-01-30 RX ADMIN — IPRATROPIUM BROMIDE AND ALBUTEROL SULFATE 1 ML: .5; 3 SOLUTION RESPIRATORY (INHALATION) at 09:24

## 2019-01-30 RX ADMIN — GUAIFENESIN AND CODEINE PHOSPHATE 1 ML: 100; 10 SOLUTION ORAL at 13:27

## 2019-01-30 RX ADMIN — GABAPENTIN 1 MG: 300 CAPSULE ORAL at 20:07

## 2019-01-30 RX ADMIN — LORAZEPAM 1 MG: 2 INJECTION, SOLUTION INTRAMUSCULAR; INTRAVENOUS at 22:45

## 2019-01-30 RX ADMIN — PANTOPRAZOLE SODIUM 1 MG: 40 TABLET, DELAYED RELEASE ORAL at 05:14

## 2019-01-30 RX ADMIN — IPRATROPIUM BROMIDE AND ALBUTEROL SULFATE 1 ML: .5; 3 SOLUTION RESPIRATORY (INHALATION) at 17:00

## 2019-01-30 RX ADMIN — BUTALBITAL, ACETAMINOPHEN, AND CAFFEINE 1 TAB: 50; 325; 40 TABLET ORAL at 20:07

## 2019-01-30 RX ADMIN — MEROPENEM 1 MLS/HR: 1 INJECTION, POWDER, FOR SOLUTION INTRAVENOUS at 08:02

## 2019-01-30 RX ADMIN — PRIMIDONE 1 MG: 250 TABLET ORAL at 08:02

## 2019-01-30 RX ADMIN — MIRTAZAPINE 1 MG: 15 TABLET, FILM COATED ORAL at 20:06

## 2019-01-30 RX ADMIN — OSELTAMIVIR PHOSPHATE 1 MG: 75 CAPSULE ORAL at 20:06

## 2019-01-30 RX ADMIN — ALPRAZOLAM 1 MG: 0.5 TABLET ORAL at 20:38

## 2019-01-31 LAB
ADD MAN DIFF?: NO
ANION GAP: 6 (ref 5–13)
BASOPHIL #: 0 10^3/UL (ref 0–0.1)
BASOPHILS %: 0.1 % (ref 0–2)
BLOOD UREA NITROGEN: 10 MG/DL (ref 7–20)
CALCIUM: 8.4 MG/DL (ref 8.4–10.2)
CARBON DIOXIDE: 26 MMOL/L (ref 21–31)
CHLORIDE: 105 MMOL/L (ref 97–110)
CREATININE: 0.56 MG/DL (ref 0.44–1)
EOSINOPHILS #: 0 10^3/UL (ref 0–0.5)
EOSINOPHILS %: 0.3 % (ref 0–7)
GLUCOSE: 120 MG/DL (ref 70–220)
HEMATOCRIT: 23.1 % (ref 37–47)
HEMOGLOBIN: 7.7 G/DL (ref 12–16)
IMMATURE GRANS #M: 0.14 10^3/UL (ref 0–0.03)
IMMATURE GRANS % (M): 1.3 % (ref 0–0.43)
LYMPHOCYTES #: 0.9 10^3/UL (ref 0.8–2.9)
LYMPHOCYTES %: 8.2 % (ref 15–51)
MAGNESIUM: 1.9 MG/DL (ref 1.7–2.5)
MEAN CORPUSCULAR HEMOGLOBIN: 28 PG (ref 29–33)
MEAN CORPUSCULAR HGB CONC: 33.3 G/DL (ref 32–37)
MEAN CORPUSCULAR VOLUME: 84 FL (ref 82–101)
MEAN PLATELET VOLUME: 9 FL (ref 7.4–10.4)
MONOCYTE #: 0.5 10^3/UL (ref 0.3–0.9)
MONOCYTES %: 5.1 % (ref 0–11)
NEUTROPHIL #: 9.1 10^3/UL (ref 1.6–7.5)
NEUTROPHILS %: 85 % (ref 39–77)
NUCLEATED RED BLOOD CELLS #: 0 10^3/UL (ref 0–0)
NUCLEATED RED BLOOD CELLS%: 0 /100WBC (ref 0–0)
PHOSPHORUS: 3.4 MG/DL (ref 2.5–4.9)
PLATELET COUNT: 340 10^3/UL (ref 140–415)
POTASSIUM: 4.3 MMOL/L (ref 3.5–5.1)
RED BLOOD COUNT: 2.75 10^6/UL (ref 4.2–5.4)
RED CELL DISTRIBUTION WIDTH: 17.2 % (ref 11.5–14.5)
SODIUM: 137 MMOL/L (ref 135–144)
WHITE BLOOD COUNT: 10.7 10^3/UL (ref 4.8–10.8)

## 2019-01-31 RX ADMIN — MEROPENEM 1 MLS/HR: 1 INJECTION, POWDER, FOR SOLUTION INTRAVENOUS at 20:07

## 2019-01-31 RX ADMIN — CASTOR OIL AND BALSAM, PERU 1 APPLIC: 788; 87 OINTMENT TOPICAL at 20:08

## 2019-01-31 RX ADMIN — PRIMIDONE 1 MG: 250 TABLET ORAL at 13:12

## 2019-01-31 RX ADMIN — IPRATROPIUM BROMIDE AND ALBUTEROL SULFATE 1 ML: .5; 3 SOLUTION RESPIRATORY (INHALATION) at 21:18

## 2019-01-31 RX ADMIN — BUDESONIDE 1 MG: 0.5 SUSPENSION RESPIRATORY (INHALATION) at 08:37

## 2019-01-31 RX ADMIN — METHYLPREDNISOLONE SODIUM SUCCINATE 1 MG: 40 INJECTION, POWDER, FOR SOLUTION INTRAMUSCULAR; INTRAVENOUS at 23:25

## 2019-01-31 RX ADMIN — HYDROCODONE BITARTRATE AND ACETAMINOPHEN 1 TAB: 10; 325 TABLET ORAL at 07:54

## 2019-01-31 RX ADMIN — MEROPENEM 1 MLS/HR: 1 INJECTION, POWDER, FOR SOLUTION INTRAVENOUS at 08:59

## 2019-01-31 RX ADMIN — LEVOFLOXACIN 1 MLS/HR: 5 INJECTION, SOLUTION INTRAVENOUS at 13:13

## 2019-01-31 RX ADMIN — ALPRAZOLAM 1 MG: 0.5 TABLET ORAL at 21:15

## 2019-01-31 RX ADMIN — IPRATROPIUM BROMIDE AND ALBUTEROL SULFATE 1 ML: .5; 3 SOLUTION RESPIRATORY (INHALATION) at 13:00

## 2019-01-31 RX ADMIN — LORAZEPAM 1 MG: 2 INJECTION, SOLUTION INTRAMUSCULAR; INTRAVENOUS at 23:10

## 2019-01-31 RX ADMIN — PANTOPRAZOLE SODIUM 1 MG: 40 TABLET, DELAYED RELEASE ORAL at 05:01

## 2019-01-31 RX ADMIN — IPRATROPIUM BROMIDE AND ALBUTEROL SULFATE 1 ML: .5; 3 SOLUTION RESPIRATORY (INHALATION) at 17:00

## 2019-01-31 RX ADMIN — BUDESONIDE 1 MG: 0.5 SUSPENSION RESPIRATORY (INHALATION) at 21:18

## 2019-01-31 RX ADMIN — PRIMIDONE 1 MG: 250 TABLET ORAL at 20:07

## 2019-01-31 RX ADMIN — GABAPENTIN 1 MG: 300 CAPSULE ORAL at 20:08

## 2019-01-31 RX ADMIN — CASTOR OIL AND BALSAM, PERU 1 APPLIC: 788; 87 OINTMENT TOPICAL at 09:00

## 2019-01-31 RX ADMIN — ONDANSETRON HYDROCHLORIDE 1 MG: 2 INJECTION, SOLUTION INTRAMUSCULAR; INTRAVENOUS at 17:56

## 2019-01-31 RX ADMIN — GUAIFENESIN AND CODEINE PHOSPHATE 1 ML: 100; 10 SOLUTION ORAL at 15:42

## 2019-01-31 RX ADMIN — MIRTAZAPINE 1 MG: 15 TABLET, FILM COATED ORAL at 20:08

## 2019-01-31 RX ADMIN — LORAZEPAM 1 MG: 2 INJECTION, SOLUTION INTRAMUSCULAR; INTRAVENOUS at 13:13

## 2019-01-31 RX ADMIN — ALPRAZOLAM 1 MG: 0.5 TABLET ORAL at 11:14

## 2019-01-31 RX ADMIN — IPRATROPIUM BROMIDE AND ALBUTEROL SULFATE 1 ML: .5; 3 SOLUTION RESPIRATORY (INHALATION) at 08:37

## 2019-01-31 RX ADMIN — TOPIRAMATE 1 MG: 25 CAPSULE, COATED PELLETS ORAL at 08:59

## 2019-01-31 RX ADMIN — METHYLPREDNISOLONE SODIUM SUCCINATE 1 MG: 40 INJECTION, POWDER, FOR SOLUTION INTRAMUSCULAR; INTRAVENOUS at 13:13

## 2019-01-31 RX ADMIN — PRIMIDONE 1 MG: 250 TABLET ORAL at 09:00

## 2019-01-31 RX ADMIN — METHYLPREDNISOLONE SODIUM SUCCINATE 1 MG: 40 INJECTION, POWDER, FOR SOLUTION INTRAMUSCULAR; INTRAVENOUS at 05:00

## 2019-01-31 RX ADMIN — NICOTINE POLACRILEX 1 MG: 2 GUM, CHEWING BUCCAL at 20:23

## 2019-01-31 RX ADMIN — HYDROCODONE BITARTRATE AND ACETAMINOPHEN 1 TAB: 10; 325 TABLET ORAL at 15:38

## 2019-01-31 RX ADMIN — OSELTAMIVIR PHOSPHATE 1 MG: 75 CAPSULE ORAL at 20:07

## 2019-01-31 RX ADMIN — LORAZEPAM 1 MG: 2 INJECTION, SOLUTION INTRAMUSCULAR; INTRAVENOUS at 08:59

## 2019-01-31 RX ADMIN — GABAPENTIN 1 MG: 300 CAPSULE ORAL at 08:59

## 2019-01-31 RX ADMIN — GABAPENTIN 1 MG: 300 CAPSULE ORAL at 13:12

## 2019-01-31 RX ADMIN — LAMOTRIGINE 1 MG: 100 TABLET ORAL at 09:00

## 2019-01-31 RX ADMIN — METHYLPREDNISOLONE SODIUM SUCCINATE 1 MG: 40 INJECTION, POWDER, FOR SOLUTION INTRAMUSCULAR; INTRAVENOUS at 18:06

## 2019-01-31 RX ADMIN — GUAIFENESIN AND CODEINE PHOSPHATE 1 ML: 100; 10 SOLUTION ORAL at 01:04

## 2019-01-31 RX ADMIN — GUAIFENESIN AND CODEINE PHOSPHATE 1 ML: 100; 10 SOLUTION ORAL at 07:54

## 2019-01-31 RX ADMIN — OSELTAMIVIR PHOSPHATE 1 MG: 75 CAPSULE ORAL at 09:01

## 2019-02-01 LAB
ADD MAN DIFF?: NO
ANION GAP: 7 (ref 5–13)
BASOPHIL #: 0 10^3/UL (ref 0–0.1)
BASOPHILS %: 0.1 % (ref 0–2)
BLOOD UREA NITROGEN: 8 MG/DL (ref 7–20)
CALCIUM: 8.2 MG/DL (ref 8.4–10.2)
CARBON DIOXIDE: 28 MMOL/L (ref 21–31)
CHLORIDE: 104 MMOL/L (ref 97–110)
CREATININE: 0.6 MG/DL (ref 0.44–1)
EOSINOPHILS #: 0.1 10^3/UL (ref 0–0.5)
EOSINOPHILS %: 1.3 % (ref 0–7)
GLUCOSE: 95 MG/DL (ref 70–220)
HEMATOCRIT: 23.2 % (ref 37–47)
HEMOGLOBIN: 7.7 G/DL (ref 12–16)
IMMATURE GRANS #M: 0.22 10^3/UL (ref 0–0.03)
IMMATURE GRANS % (M): 2.2 % (ref 0–0.43)
LYMPHOCYTES #: 1.6 10^3/UL (ref 0.8–2.9)
LYMPHOCYTES %: 15.8 % (ref 15–51)
MAGNESIUM: 1.6 MG/DL (ref 1.7–2.5)
MEAN CORPUSCULAR HEMOGLOBIN: 27.9 PG (ref 29–33)
MEAN CORPUSCULAR HGB CONC: 33.2 G/DL (ref 32–37)
MEAN CORPUSCULAR VOLUME: 84.1 FL (ref 82–101)
MEAN PLATELET VOLUME: 9 FL (ref 7.4–10.4)
MONOCYTE #: 0.7 10^3/UL (ref 0.3–0.9)
MONOCYTES %: 6.9 % (ref 0–11)
NEUTROPHIL #: 7.3 10^3/UL (ref 1.6–7.5)
NEUTROPHILS %: 73.7 % (ref 39–77)
NUCLEATED RED BLOOD CELLS #: 0 10^3/UL (ref 0–0)
NUCLEATED RED BLOOD CELLS%: 0 /100WBC (ref 0–0)
PHOSPHORUS: 3.7 MG/DL (ref 2.5–4.9)
PLATELET COUNT: 373 10^3/UL (ref 140–415)
POTASSIUM: 4.2 MMOL/L (ref 3.5–5.1)
RED BLOOD COUNT: 2.76 10^6/UL (ref 4.2–5.4)
RED CELL DISTRIBUTION WIDTH: 17.2 % (ref 11.5–14.5)
SODIUM: 139 MMOL/L (ref 135–144)
WHITE BLOOD COUNT: 9.9 10^3/UL (ref 4.8–10.8)

## 2019-02-01 RX ADMIN — MIRTAZAPINE 1 MG: 15 TABLET, FILM COATED ORAL at 20:55

## 2019-02-01 RX ADMIN — PRIMIDONE 1 MG: 250 TABLET ORAL at 08:48

## 2019-02-01 RX ADMIN — LAMOTRIGINE 1 MG: 100 TABLET ORAL at 12:34

## 2019-02-01 RX ADMIN — LEVOFLOXACIN 1 MLS/HR: 5 INJECTION, SOLUTION INTRAVENOUS at 09:10

## 2019-02-01 RX ADMIN — GABAPENTIN 1 MG: 300 CAPSULE ORAL at 12:35

## 2019-02-01 RX ADMIN — NICOTINE POLACRILEX 1 MG: 2 GUM, CHEWING BUCCAL at 21:25

## 2019-02-01 RX ADMIN — NICOTINE POLACRILEX 1 MG: 2 GUM, CHEWING BUCCAL at 10:17

## 2019-02-01 RX ADMIN — BUDESONIDE 1 MG: 0.5 SUSPENSION RESPIRATORY (INHALATION) at 10:31

## 2019-02-01 RX ADMIN — CASTOR OIL AND BALSAM, PERU 1 APPLIC: 788; 87 OINTMENT TOPICAL at 20:56

## 2019-02-01 RX ADMIN — GABAPENTIN 1 MG: 300 CAPSULE ORAL at 20:55

## 2019-02-01 RX ADMIN — METHYLPREDNISOLONE SODIUM SUCCINATE 1 MG: 40 INJECTION, POWDER, FOR SOLUTION INTRAMUSCULAR; INTRAVENOUS at 23:45

## 2019-02-01 RX ADMIN — IPRATROPIUM BROMIDE AND ALBUTEROL SULFATE 1 ML: .5; 3 SOLUTION RESPIRATORY (INHALATION) at 20:36

## 2019-02-01 RX ADMIN — PRIMIDONE 1 MG: 250 TABLET ORAL at 20:54

## 2019-02-01 RX ADMIN — METHYLPREDNISOLONE SODIUM SUCCINATE 1 MG: 40 INJECTION, POWDER, FOR SOLUTION INTRAMUSCULAR; INTRAVENOUS at 05:11

## 2019-02-01 RX ADMIN — IPRATROPIUM BROMIDE AND ALBUTEROL SULFATE 1 ML: .5; 3 SOLUTION RESPIRATORY (INHALATION) at 10:31

## 2019-02-01 RX ADMIN — CASTOR OIL AND BALSAM, PERU 1 APPLIC: 788; 87 OINTMENT TOPICAL at 09:09

## 2019-02-01 RX ADMIN — BENZONATATE 1 MG: 100 CAPSULE ORAL at 08:49

## 2019-02-01 RX ADMIN — OSELTAMIVIR PHOSPHATE 1 MG: 75 CAPSULE ORAL at 20:54

## 2019-02-01 RX ADMIN — NICOTINE POLACRILEX 1 MG: 2 GUM, CHEWING BUCCAL at 01:41

## 2019-02-01 RX ADMIN — TOPIRAMATE 1 MG: 25 CAPSULE, COATED PELLETS ORAL at 08:48

## 2019-02-01 RX ADMIN — PANTOPRAZOLE SODIUM 1 MG: 40 TABLET, DELAYED RELEASE ORAL at 05:11

## 2019-02-01 RX ADMIN — HYDROCODONE BITARTRATE AND ACETAMINOPHEN 1 TAB: 10; 325 TABLET ORAL at 00:59

## 2019-02-01 RX ADMIN — LORAZEPAM 1 MG: 2 INJECTION, SOLUTION INTRAMUSCULAR; INTRAVENOUS at 08:49

## 2019-02-01 RX ADMIN — BUDESONIDE 1 MG: 0.5 SUSPENSION RESPIRATORY (INHALATION) at 20:36

## 2019-02-01 RX ADMIN — METHYLPREDNISOLONE SODIUM SUCCINATE 1 MG: 40 INJECTION, POWDER, FOR SOLUTION INTRAMUSCULAR; INTRAVENOUS at 12:35

## 2019-02-01 RX ADMIN — OSELTAMIVIR PHOSPHATE 1 MG: 75 CAPSULE ORAL at 08:48

## 2019-02-01 RX ADMIN — MEROPENEM 1 MLS/HR: 1 INJECTION, POWDER, FOR SOLUTION INTRAVENOUS at 08:47

## 2019-02-01 RX ADMIN — METHYLPREDNISOLONE SODIUM SUCCINATE 1 MG: 40 INJECTION, POWDER, FOR SOLUTION INTRAMUSCULAR; INTRAVENOUS at 17:25

## 2019-02-01 RX ADMIN — GUAIFENESIN AND CODEINE PHOSPHATE 1 ML: 100; 10 SOLUTION ORAL at 08:55

## 2019-02-01 RX ADMIN — PRIMIDONE 1 MG: 250 TABLET ORAL at 12:35

## 2019-02-01 RX ADMIN — IPRATROPIUM BROMIDE AND ALBUTEROL SULFATE 1 ML: .5; 3 SOLUTION RESPIRATORY (INHALATION) at 13:59

## 2019-02-01 RX ADMIN — ALPRAZOLAM 1 MG: 0.5 TABLET ORAL at 21:18

## 2019-02-01 RX ADMIN — GABAPENTIN 1 MG: 300 CAPSULE ORAL at 08:48

## 2019-02-01 RX ADMIN — LORAZEPAM 1 MG: 2 INJECTION, SOLUTION INTRAMUSCULAR; INTRAVENOUS at 04:11

## 2019-02-01 RX ADMIN — ONDANSETRON HYDROCHLORIDE 1 MG: 2 INJECTION, SOLUTION INTRAMUSCULAR; INTRAVENOUS at 21:18

## 2019-02-01 RX ADMIN — IPRATROPIUM BROMIDE AND ALBUTEROL SULFATE 1 ML: .5; 3 SOLUTION RESPIRATORY (INHALATION) at 17:11

## 2019-02-01 RX ADMIN — NICOTINE POLACRILEX 1 MG: 2 GUM, CHEWING BUCCAL at 13:52

## 2019-02-01 RX ADMIN — MEROPENEM 1 MLS/HR: 1 INJECTION, POWDER, FOR SOLUTION INTRAVENOUS at 20:54

## 2019-02-01 RX ADMIN — LORAZEPAM 1 MG: 2 INJECTION, SOLUTION INTRAMUSCULAR; INTRAVENOUS at 15:58

## 2019-02-01 RX ADMIN — MAGNESIUM SULFATE HEPTAHYDRATE 1 MLS/HR: 40 INJECTION, SOLUTION INTRAVENOUS at 09:10

## 2019-02-01 RX ADMIN — HYDROCODONE BITARTRATE AND ACETAMINOPHEN 1 TAB: 10; 325 TABLET ORAL at 14:30

## 2019-02-02 LAB
ADD MAN DIFF?: NO
ANION GAP: 3 (ref 5–13)
BASOPHIL #: 0 10^3/UL (ref 0–0.1)
BASOPHILS %: 0.2 % (ref 0–2)
BLOOD UREA NITROGEN: 7 MG/DL (ref 7–20)
CALCIUM: 8.4 MG/DL (ref 8.4–10.2)
CARBON DIOXIDE: 28 MMOL/L (ref 21–31)
CHLORIDE: 106 MMOL/L (ref 97–110)
CREATININE: 0.6 MG/DL (ref 0.44–1)
EOSINOPHILS #: 0 10^3/UL (ref 0–0.5)
EOSINOPHILS %: 0.1 % (ref 0–7)
GLUCOSE: 132 MG/DL (ref 70–220)
HEMATOCRIT: 25.5 % (ref 37–47)
HEMOGLOBIN: 8.6 G/DL (ref 12–16)
IMMATURE GRANS #M: 0.25 10^3/UL (ref 0–0.03)
IMMATURE GRANS % (M): 2.3 % (ref 0–0.43)
LYMPHOCYTES #: 1.1 10^3/UL (ref 0.8–2.9)
LYMPHOCYTES %: 10.7 % (ref 15–51)
MAGNESIUM: 1.9 MG/DL (ref 1.7–2.5)
MEAN CORPUSCULAR HEMOGLOBIN: 28 PG (ref 29–33)
MEAN CORPUSCULAR HGB CONC: 33.7 G/DL (ref 32–37)
MEAN CORPUSCULAR VOLUME: 83.1 FL (ref 82–101)
MEAN PLATELET VOLUME: 9.1 FL (ref 7.4–10.4)
MONOCYTE #: 0.6 10^3/UL (ref 0.3–0.9)
MONOCYTES %: 5.6 % (ref 0–11)
NEUTROPHIL #: 8.6 10^3/UL (ref 1.6–7.5)
NEUTROPHILS %: 81.1 % (ref 39–77)
NUCLEATED RED BLOOD CELLS #: 0 10^3/UL (ref 0–0)
NUCLEATED RED BLOOD CELLS%: 0 /100WBC (ref 0–0)
PHOSPHORUS: 3.8 MG/DL (ref 2.5–4.9)
PLATELET COUNT: 459 10^3/UL (ref 140–415)
POTASSIUM: 4.1 MMOL/L (ref 3.5–5.1)
RED BLOOD COUNT: 3.07 10^6/UL (ref 4.2–5.4)
RED CELL DISTRIBUTION WIDTH: 17.3 % (ref 11.5–14.5)
SODIUM: 137 MMOL/L (ref 135–144)
WHITE BLOOD COUNT: 10.6 10^3/UL (ref 4.8–10.8)

## 2019-02-02 RX ADMIN — CASTOR OIL AND BALSAM, PERU 1 APPLIC: 788; 87 OINTMENT TOPICAL at 08:25

## 2019-02-02 RX ADMIN — PRIMIDONE 1 MG: 250 TABLET ORAL at 20:19

## 2019-02-02 RX ADMIN — THIAMINE HYDROCHLORIDE 1 MLS/HR: 100 INJECTION, SOLUTION INTRAMUSCULAR; INTRAVENOUS at 23:30

## 2019-02-02 RX ADMIN — METHYLPREDNISOLONE SODIUM SUCCINATE 1 MG: 40 INJECTION, POWDER, FOR SOLUTION INTRAMUSCULAR; INTRAVENOUS at 23:08

## 2019-02-02 RX ADMIN — GABAPENTIN 1 MG: 300 CAPSULE ORAL at 12:18

## 2019-02-02 RX ADMIN — METHYLPREDNISOLONE SODIUM SUCCINATE 1 MG: 40 INJECTION, POWDER, FOR SOLUTION INTRAMUSCULAR; INTRAVENOUS at 17:18

## 2019-02-02 RX ADMIN — BUDESONIDE 1 MG: 0.5 SUSPENSION RESPIRATORY (INHALATION) at 08:37

## 2019-02-02 RX ADMIN — GABAPENTIN 1 MG: 300 CAPSULE ORAL at 20:19

## 2019-02-02 RX ADMIN — LEVOFLOXACIN 1 MLS/HR: 5 INJECTION, SOLUTION INTRAVENOUS at 09:47

## 2019-02-02 RX ADMIN — METHYLPREDNISOLONE SODIUM SUCCINATE 1 MG: 40 INJECTION, POWDER, FOR SOLUTION INTRAMUSCULAR; INTRAVENOUS at 11:24

## 2019-02-02 RX ADMIN — PRIMIDONE 1 MG: 250 TABLET ORAL at 12:18

## 2019-02-02 RX ADMIN — ALPRAZOLAM 1 MG: 0.5 TABLET ORAL at 12:16

## 2019-02-02 RX ADMIN — LAMOTRIGINE 1 MG: 100 TABLET ORAL at 09:47

## 2019-02-02 RX ADMIN — GABAPENTIN 1 MG: 300 CAPSULE ORAL at 08:22

## 2019-02-02 RX ADMIN — HYDROCODONE BITARTRATE AND ACETAMINOPHEN 1 TAB: 10; 325 TABLET ORAL at 19:39

## 2019-02-02 RX ADMIN — THIAMINE HYDROCHLORIDE 1 MLS/HR: 100 INJECTION, SOLUTION INTRAMUSCULAR; INTRAVENOUS at 12:45

## 2019-02-02 RX ADMIN — BARIUM SULFATE 1 ML: 21 SUSPENSION ORAL at 15:53

## 2019-02-02 RX ADMIN — MEROPENEM 1 MLS/HR: 1 INJECTION, POWDER, FOR SOLUTION INTRAVENOUS at 20:19

## 2019-02-02 RX ADMIN — GUAIFENESIN AND CODEINE PHOSPHATE 1 ML: 100; 10 SOLUTION ORAL at 11:27

## 2019-02-02 RX ADMIN — BUDESONIDE 1 MG: 0.5 SUSPENSION RESPIRATORY (INHALATION) at 20:54

## 2019-02-02 RX ADMIN — ACETAMINOPHEN 1 MG: 325 TABLET, FILM COATED ORAL at 10:33

## 2019-02-02 RX ADMIN — PRIMIDONE 1 MG: 250 TABLET ORAL at 08:24

## 2019-02-02 RX ADMIN — NICOTINE POLACRILEX 1 MG: 2 GUM, CHEWING BUCCAL at 19:40

## 2019-02-02 RX ADMIN — ALBUTEROL SULFATE 1 MG: 2.5 SOLUTION RESPIRATORY (INHALATION) at 05:45

## 2019-02-02 RX ADMIN — GUAIFENESIN AND CODEINE PHOSPHATE 1 ML: 100; 10 SOLUTION ORAL at 20:22

## 2019-02-02 RX ADMIN — THIAMINE HYDROCHLORIDE 1 MLS/HR: 100 INJECTION, SOLUTION INTRAMUSCULAR; INTRAVENOUS at 03:35

## 2019-02-02 RX ADMIN — GUAIFENESIN AND CODEINE PHOSPHATE 1 ML: 100; 10 SOLUTION ORAL at 07:36

## 2019-02-02 RX ADMIN — MIRTAZAPINE 1 MG: 15 TABLET, FILM COATED ORAL at 20:19

## 2019-02-02 RX ADMIN — METHYLPREDNISOLONE SODIUM SUCCINATE 1 MG: 40 INJECTION, POWDER, FOR SOLUTION INTRAMUSCULAR; INTRAVENOUS at 05:34

## 2019-02-02 RX ADMIN — PANTOPRAZOLE SODIUM 1 MG: 40 TABLET, DELAYED RELEASE ORAL at 05:34

## 2019-02-02 RX ADMIN — ALPRAZOLAM 1 MG: 0.5 TABLET ORAL at 23:33

## 2019-02-02 RX ADMIN — BUTALBITAL, ACETAMINOPHEN, AND CAFFEINE 1 TAB: 50; 325; 40 TABLET ORAL at 10:49

## 2019-02-02 RX ADMIN — IPRATROPIUM BROMIDE AND ALBUTEROL SULFATE 1 ML: .5; 3 SOLUTION RESPIRATORY (INHALATION) at 14:55

## 2019-02-02 RX ADMIN — ZOLPIDEM TARTRATE 1 MG: 5 TABLET, FILM COATED ORAL at 23:02

## 2019-02-02 RX ADMIN — IPRATROPIUM BROMIDE AND ALBUTEROL SULFATE 1 ML: .5; 3 SOLUTION RESPIRATORY (INHALATION) at 20:54

## 2019-02-02 RX ADMIN — NICOTINE POLACRILEX 1 MG: 2 GUM, CHEWING BUCCAL at 04:32

## 2019-02-02 RX ADMIN — MEROPENEM 1 MLS/HR: 1 INJECTION, POWDER, FOR SOLUTION INTRAVENOUS at 08:22

## 2019-02-02 RX ADMIN — CASTOR OIL AND BALSAM, PERU 1 APPLIC: 788; 87 OINTMENT TOPICAL at 20:19

## 2019-02-02 RX ADMIN — TOPIRAMATE 1 MG: 25 CAPSULE, COATED PELLETS ORAL at 08:28

## 2019-02-02 RX ADMIN — THIAMINE HYDROCHLORIDE 1 MLS/HR: 100 INJECTION, SOLUTION INTRAMUSCULAR; INTRAVENOUS at 03:47

## 2019-02-02 RX ADMIN — IPRATROPIUM BROMIDE AND ALBUTEROL SULFATE 1 ML: .5; 3 SOLUTION RESPIRATORY (INHALATION) at 08:37

## 2019-02-02 RX ADMIN — IPRATROPIUM BROMIDE AND ALBUTEROL SULFATE 1 ML: .5; 3 SOLUTION RESPIRATORY (INHALATION) at 16:22

## 2019-02-03 LAB
ADD MAN DIFF?: NO
ANION GAP: 5 (ref 5–13)
BASOPHIL #: 0 10^3/UL (ref 0–0.1)
BASOPHILS %: 0.2 % (ref 0–2)
BLOOD UREA NITROGEN: 7 MG/DL (ref 7–20)
CALCIUM: 8.3 MG/DL (ref 8.4–10.2)
CARBON DIOXIDE: 25 MMOL/L (ref 21–31)
CHLORIDE: 107 MMOL/L (ref 97–110)
CREATININE: 0.5 MG/DL (ref 0.44–1)
EOSINOPHILS #: 0 10^3/UL (ref 0–0.5)
EOSINOPHILS %: 0.3 % (ref 0–7)
GLUCOSE: 151 MG/DL (ref 70–220)
HEMATOCRIT: 24.3 % (ref 37–47)
HEMOGLOBIN: 8.1 G/DL (ref 12–16)
IMMATURE GRANS #M: 0.41 10^3/UL (ref 0–0.03)
IMMATURE GRANS % (M): 3.9 % (ref 0–0.43)
LYMPHOCYTES #: 1.6 10^3/UL (ref 0.8–2.9)
LYMPHOCYTES %: 15.1 % (ref 15–51)
MAGNESIUM: 1.8 MG/DL (ref 1.7–2.5)
MEAN CORPUSCULAR HEMOGLOBIN: 28.5 PG (ref 29–33)
MEAN CORPUSCULAR HGB CONC: 33.3 G/DL (ref 32–37)
MEAN CORPUSCULAR VOLUME: 85.6 FL (ref 82–101)
MEAN PLATELET VOLUME: 9.2 FL (ref 7.4–10.4)
MONOCYTE #: 0.8 10^3/UL (ref 0.3–0.9)
MONOCYTES %: 8 % (ref 0–11)
NEUTROPHIL #: 7.6 10^3/UL (ref 1.6–7.5)
NEUTROPHILS %: 72.5 % (ref 39–77)
NUCLEATED RED BLOOD CELLS #: 0 10^3/UL (ref 0–0)
NUCLEATED RED BLOOD CELLS%: 0 /100WBC (ref 0–0)
PHOSPHORUS: 3.4 MG/DL (ref 2.5–4.9)
PLATELET COUNT: 547 10^3/UL (ref 140–415)
POTASSIUM: 3.8 MMOL/L (ref 3.5–5.1)
RED BLOOD COUNT: 2.84 10^6/UL (ref 4.2–5.4)
RED CELL DISTRIBUTION WIDTH: 17.6 % (ref 11.5–14.5)
SODIUM: 137 MMOL/L (ref 135–144)
WHITE BLOOD COUNT: 10.5 10^3/UL (ref 4.8–10.8)

## 2019-02-03 RX ADMIN — NICOTINE POLACRILEX 1 MG: 2 GUM, CHEWING BUCCAL at 16:41

## 2019-02-03 RX ADMIN — IPRATROPIUM BROMIDE AND ALBUTEROL SULFATE 1 ML: .5; 3 SOLUTION RESPIRATORY (INHALATION) at 09:41

## 2019-02-03 RX ADMIN — LEVOFLOXACIN 1 MLS/HR: 5 INJECTION, SOLUTION INTRAVENOUS at 11:20

## 2019-02-03 RX ADMIN — BUTALBITAL, ACETAMINOPHEN, AND CAFFEINE 1 TAB: 50; 325; 40 TABLET ORAL at 16:00

## 2019-02-03 RX ADMIN — DOXYCYCLINE HYCLATE 1 MG: 100 TABLET, COATED ORAL at 20:38

## 2019-02-03 RX ADMIN — BUDESONIDE 1 MG: 0.5 SUSPENSION RESPIRATORY (INHALATION) at 20:49

## 2019-02-03 RX ADMIN — HYDROCODONE BITARTRATE AND ACETAMINOPHEN 1 TAB: 10; 325 TABLET ORAL at 08:13

## 2019-02-03 RX ADMIN — GABAPENTIN 1 MG: 300 CAPSULE ORAL at 08:12

## 2019-02-03 RX ADMIN — BUDESONIDE 1 MG: 0.5 SUSPENSION RESPIRATORY (INHALATION) at 09:41

## 2019-02-03 RX ADMIN — CASTOR OIL AND BALSAM, PERU 1 APPLIC: 788; 87 OINTMENT TOPICAL at 08:12

## 2019-02-03 RX ADMIN — HYDROCODONE BITARTRATE AND ACETAMINOPHEN 1 TAB: 10; 325 TABLET ORAL at 20:37

## 2019-02-03 RX ADMIN — GABAPENTIN 1 MG: 300 CAPSULE ORAL at 12:07

## 2019-02-03 RX ADMIN — PRIMIDONE 1 MG: 250 TABLET ORAL at 08:12

## 2019-02-03 RX ADMIN — MEROPENEM 1 MLS/HR: 1 INJECTION, POWDER, FOR SOLUTION INTRAVENOUS at 08:11

## 2019-02-03 RX ADMIN — NICOTINE POLACRILEX 1 MG: 2 GUM, CHEWING BUCCAL at 09:21

## 2019-02-03 RX ADMIN — NICOTINE POLACRILEX 1 MG: 2 GUM, CHEWING BUCCAL at 21:02

## 2019-02-03 RX ADMIN — PRIMIDONE 1 MG: 250 TABLET ORAL at 20:38

## 2019-02-03 RX ADMIN — METHYLPREDNISOLONE SODIUM SUCCINATE 1 MG: 40 INJECTION, POWDER, FOR SOLUTION INTRAMUSCULAR; INTRAVENOUS at 20:40

## 2019-02-03 RX ADMIN — ZOLPIDEM TARTRATE 1 MG: 5 TABLET, FILM COATED ORAL at 22:15

## 2019-02-03 RX ADMIN — IPRATROPIUM BROMIDE AND ALBUTEROL SULFATE 1 ML: .5; 3 SOLUTION RESPIRATORY (INHALATION) at 16:44

## 2019-02-03 RX ADMIN — PRIMIDONE 1 MG: 250 TABLET ORAL at 12:07

## 2019-02-03 RX ADMIN — ALPRAZOLAM 1 MG: 0.5 TABLET ORAL at 12:58

## 2019-02-03 RX ADMIN — MIRTAZAPINE 1 MG: 15 TABLET, FILM COATED ORAL at 20:38

## 2019-02-03 RX ADMIN — THIAMINE HYDROCHLORIDE 1 MLS/HR: 100 INJECTION, SOLUTION INTRAMUSCULAR; INTRAVENOUS at 09:30

## 2019-02-03 RX ADMIN — TOPIRAMATE 1 MG: 25 CAPSULE, COATED PELLETS ORAL at 08:12

## 2019-02-03 RX ADMIN — LAMOTRIGINE 1 MG: 100 TABLET ORAL at 11:20

## 2019-02-03 RX ADMIN — GUAIFENESIN AND CODEINE PHOSPHATE 1 ML: 100; 10 SOLUTION ORAL at 11:28

## 2019-02-03 RX ADMIN — IPRATROPIUM BROMIDE AND ALBUTEROL SULFATE 1 ML: .5; 3 SOLUTION RESPIRATORY (INHALATION) at 13:39

## 2019-02-03 RX ADMIN — PANTOPRAZOLE SODIUM 1 MG: 40 TABLET, DELAYED RELEASE ORAL at 05:28

## 2019-02-03 RX ADMIN — GUAIFENESIN AND CODEINE PHOSPHATE 1 ML: 100; 10 SOLUTION ORAL at 21:01

## 2019-02-03 RX ADMIN — NICOTINE POLACRILEX 1 MG: 2 GUM, CHEWING BUCCAL at 03:16

## 2019-02-03 RX ADMIN — CASTOR OIL AND BALSAM, PERU 1 APPLIC: 788; 87 OINTMENT TOPICAL at 20:43

## 2019-02-03 RX ADMIN — GABAPENTIN 1 MG: 300 CAPSULE ORAL at 20:38

## 2019-02-03 RX ADMIN — BUTALBITAL, ACETAMINOPHEN, AND CAFFEINE 1 TAB: 50; 325; 40 TABLET ORAL at 03:17

## 2019-02-03 RX ADMIN — IPRATROPIUM BROMIDE AND ALBUTEROL SULFATE 1 ML: .5; 3 SOLUTION RESPIRATORY (INHALATION) at 20:49

## 2019-02-03 RX ADMIN — METHYLPREDNISOLONE SODIUM SUCCINATE 1 MG: 40 INJECTION, POWDER, FOR SOLUTION INTRAMUSCULAR; INTRAVENOUS at 05:28

## 2019-02-04 LAB
ABNORMAL IP MESSAGE: 1
ADD MAN DIFF?: YES
ANION GAP: 7 (ref 5–13)
ANISOCYTOSIS: (no result) (ref 0–0)
BASOPHIL #M: 0.1 10^3/UL (ref 0–0)
BASOPHILS % (M): 2 % (ref 0–2)
BLOOD UREA NITROGEN: 9 MG/DL (ref 7–20)
CALCIUM: 8.9 MG/DL (ref 8.4–10.2)
CARBON DIOXIDE: 29 MMOL/L (ref 21–31)
CHLORIDE: 102 MMOL/L (ref 97–110)
CREATININE: 0.58 MG/DL (ref 0.44–1)
EOSINOPHILS % (M): 1 % (ref 0–7)
GIANT THROMBO% (M): 5 % (ref 0–0)
GLUCOSE: 112 MG/DL (ref 70–220)
HEMATOCRIT: 26.5 % (ref 37–47)
HEMOGLOBIN: 8.6 G/DL (ref 12–16)
IMMATURE GRANS #M: 0.42 10^3/UL (ref 0–0.03)
IMMATURE GRANS % (M): 5.3 % (ref 0–0.43)
LYMPHOCYTES #M: 3.6 10^3/UL (ref 0.8–2.9)
LYMPHOCYTES % (M): 46 % (ref 15–51)
MACROCYTOSIS: (no result) (ref 0–0)
MAGNESIUM: 1.9 MG/DL (ref 1.7–2.5)
MEAN CORPUSCULAR HEMOGLOBIN: 28 PG (ref 29–33)
MEAN CORPUSCULAR HGB CONC: 32.5 G/DL (ref 32–37)
MEAN CORPUSCULAR VOLUME: 86.3 FL (ref 82–101)
MEAN PLATELET VOLUME: 9.2 FL (ref 7.4–10.4)
MONOCYTE #M: 0.3 10^3/UL (ref 0.3–0.9)
MONOCYTES % (M): 4 % (ref 0–11)
MYELOCYTES #M: 0.1 10^3/UL (ref 0–0)
MYELOCYTES % (M): 2 % (ref 0–0)
NUCLEATED RED BLOOD CELLS%: 0 /100WBC (ref 0–0)
PHOSPHORUS: 4 MG/DL (ref 2.5–4.9)
PLATELET COUNT: 526 10^3/UL (ref 140–415)
PLATELET ESTIMATE: (no result)
POSITIVE DIFF: (no result)
POTASSIUM: 4.1 MMOL/L (ref 3.5–5.1)
RED BLOOD COUNT: 3.07 10^6/UL (ref 4.2–5.4)
RED CELL DISTRIBUTION WIDTH: 18.1 % (ref 11.5–14.5)
SEGMENTED NEUTROPHILS (M) %: 45 % (ref 39–77)
SMUDGE%M: 22 % (ref 0–0)
SODIUM: 138 MMOL/L (ref 135–144)
WHITE BLOOD COUNT: 7.9 10^3/UL (ref 4.8–10.8)

## 2019-02-04 RX ADMIN — PRIMIDONE 1 MG: 250 TABLET ORAL at 20:57

## 2019-02-04 RX ADMIN — PRIMIDONE 1 MG: 250 TABLET ORAL at 08:25

## 2019-02-04 RX ADMIN — IPRATROPIUM BROMIDE AND ALBUTEROL SULFATE 1 ML: .5; 3 SOLUTION RESPIRATORY (INHALATION) at 10:39

## 2019-02-04 RX ADMIN — TOPIRAMATE 1 MG: 25 CAPSULE, COATED PELLETS ORAL at 08:25

## 2019-02-04 RX ADMIN — BUTALBITAL, ACETAMINOPHEN, AND CAFFEINE 1 TAB: 50; 325; 40 TABLET ORAL at 03:44

## 2019-02-04 RX ADMIN — ALBUTEROL SULFATE 1 MG: 2.5 SOLUTION RESPIRATORY (INHALATION) at 02:33

## 2019-02-04 RX ADMIN — METHYLPREDNISOLONE SODIUM SUCCINATE 1 MG: 40 INJECTION, POWDER, FOR SOLUTION INTRAMUSCULAR; INTRAVENOUS at 20:57

## 2019-02-04 RX ADMIN — BUTALBITAL, ACETAMINOPHEN, AND CAFFEINE 1 TAB: 50; 325; 40 TABLET ORAL at 18:28

## 2019-02-04 RX ADMIN — GABAPENTIN 1 MG: 300 CAPSULE ORAL at 20:56

## 2019-02-04 RX ADMIN — NICOTINE POLACRILEX 1 MG: 2 GUM, CHEWING BUCCAL at 05:05

## 2019-02-04 RX ADMIN — BUDESONIDE 1 MG: 0.5 SUSPENSION RESPIRATORY (INHALATION) at 20:08

## 2019-02-04 RX ADMIN — NICOTINE POLACRILEX 1 MG: 2 GUM, CHEWING BUCCAL at 12:42

## 2019-02-04 RX ADMIN — BUDESONIDE 1 MG: 0.5 SUSPENSION RESPIRATORY (INHALATION) at 10:51

## 2019-02-04 RX ADMIN — DOXYCYCLINE HYCLATE 1 MG: 100 TABLET, COATED ORAL at 20:56

## 2019-02-04 RX ADMIN — GABAPENTIN 1 MG: 300 CAPSULE ORAL at 12:41

## 2019-02-04 RX ADMIN — HYDROCODONE BITARTRATE AND ACETAMINOPHEN 1 TAB: 10; 325 TABLET ORAL at 10:19

## 2019-02-04 RX ADMIN — PRIMIDONE 1 MG: 250 TABLET ORAL at 14:50

## 2019-02-04 RX ADMIN — IPRATROPIUM BROMIDE AND ALBUTEROL SULFATE 1 ML: .5; 3 SOLUTION RESPIRATORY (INHALATION) at 20:08

## 2019-02-04 RX ADMIN — CASTOR OIL AND BALSAM, PERU 1 APPLIC: 788; 87 OINTMENT TOPICAL at 08:24

## 2019-02-04 RX ADMIN — GABAPENTIN 1 MG: 300 CAPSULE ORAL at 08:25

## 2019-02-04 RX ADMIN — METHYLPREDNISOLONE SODIUM SUCCINATE 1 MG: 40 INJECTION, POWDER, FOR SOLUTION INTRAMUSCULAR; INTRAVENOUS at 08:25

## 2019-02-04 RX ADMIN — DOXYCYCLINE HYCLATE 1 MG: 100 TABLET, COATED ORAL at 08:25

## 2019-02-04 RX ADMIN — NICOTINE POLACRILEX 1 MG: 2 GUM, CHEWING BUCCAL at 19:39

## 2019-02-04 RX ADMIN — ZOLPIDEM TARTRATE 1 MG: 5 TABLET, FILM COATED ORAL at 21:08

## 2019-02-04 RX ADMIN — PANTOPRAZOLE SODIUM 1 MG: 40 TABLET, DELAYED RELEASE ORAL at 05:05

## 2019-02-04 RX ADMIN — LEVOFLOXACIN 1 MG: 500 TABLET, FILM COATED ORAL at 05:05

## 2019-02-04 RX ADMIN — MIRTAZAPINE 1 MG: 15 TABLET, FILM COATED ORAL at 20:57

## 2019-02-04 RX ADMIN — ALPRAZOLAM 1 MG: 0.5 TABLET ORAL at 12:41

## 2019-02-04 RX ADMIN — CASTOR OIL AND BALSAM, PERU 1 APPLIC: 788; 87 OINTMENT TOPICAL at 20:57

## 2019-02-04 RX ADMIN — IPRATROPIUM BROMIDE AND ALBUTEROL SULFATE 1 ML: .5; 3 SOLUTION RESPIRATORY (INHALATION) at 13:10

## 2019-02-04 RX ADMIN — GUAIFENESIN AND CODEINE PHOSPHATE 1 ML: 100; 10 SOLUTION ORAL at 05:10

## 2019-02-04 RX ADMIN — GUAIFENESIN AND CODEINE PHOSPHATE 1 ML: 100; 10 SOLUTION ORAL at 19:39

## 2019-02-04 RX ADMIN — LAMOTRIGINE 1 MG: 100 TABLET ORAL at 08:25

## 2019-02-05 RX ADMIN — IPRATROPIUM BROMIDE AND ALBUTEROL SULFATE 1 ML: .5; 3 SOLUTION RESPIRATORY (INHALATION) at 17:00

## 2019-02-05 RX ADMIN — PANTOPRAZOLE SODIUM 1 MG: 40 TABLET, DELAYED RELEASE ORAL at 05:34

## 2019-02-05 RX ADMIN — TOPIRAMATE 1 MG: 25 CAPSULE, COATED PELLETS ORAL at 09:02

## 2019-02-05 RX ADMIN — LAMOTRIGINE 1 MG: 100 TABLET ORAL at 09:02

## 2019-02-05 RX ADMIN — GUAIFENESIN AND CODEINE PHOSPHATE 1 ML: 100; 10 SOLUTION ORAL at 01:47

## 2019-02-05 RX ADMIN — BUTALBITAL, ACETAMINOPHEN, AND CAFFEINE 1 TAB: 50; 325; 40 TABLET ORAL at 14:40

## 2019-02-05 RX ADMIN — GABAPENTIN 1 MG: 300 CAPSULE ORAL at 13:38

## 2019-02-05 RX ADMIN — CASTOR OIL AND BALSAM, PERU 1 APPLIC: 788; 87 OINTMENT TOPICAL at 09:07

## 2019-02-05 RX ADMIN — NICOTINE POLACRILEX 1 MG: 2 GUM, CHEWING BUCCAL at 09:30

## 2019-02-05 RX ADMIN — IPRATROPIUM BROMIDE AND ALBUTEROL SULFATE 1 ML: .5; 3 SOLUTION RESPIRATORY (INHALATION) at 09:44

## 2019-02-05 RX ADMIN — DOXYCYCLINE HYCLATE 1 MG: 100 TABLET, COATED ORAL at 09:01

## 2019-02-05 RX ADMIN — ALPRAZOLAM 1 MG: 0.5 TABLET ORAL at 00:13

## 2019-02-05 RX ADMIN — LEVOFLOXACIN 1 MG: 500 TABLET, FILM COATED ORAL at 05:34

## 2019-02-05 RX ADMIN — IPRATROPIUM BROMIDE AND ALBUTEROL SULFATE 1 ML: .5; 3 SOLUTION RESPIRATORY (INHALATION) at 15:01

## 2019-02-05 RX ADMIN — ALPRAZOLAM 1 MG: 0.5 TABLET ORAL at 10:47

## 2019-02-05 RX ADMIN — NICOTINE POLACRILEX 1 MG: 2 GUM, CHEWING BUCCAL at 04:51

## 2019-02-05 RX ADMIN — BUDESONIDE 1 MG: 0.5 SUSPENSION RESPIRATORY (INHALATION) at 09:49

## 2019-02-05 RX ADMIN — HYDROCODONE BITARTRATE AND ACETAMINOPHEN 1 TAB: 10; 325 TABLET ORAL at 15:49

## 2019-02-05 RX ADMIN — HYDROCODONE BITARTRATE AND ACETAMINOPHEN 1 TAB: 10; 325 TABLET ORAL at 01:47

## 2019-02-05 RX ADMIN — PRIMIDONE 1 MG: 250 TABLET ORAL at 13:38

## 2019-02-05 RX ADMIN — LORAZEPAM 1 MG: 2 INJECTION, SOLUTION INTRAMUSCULAR; INTRAVENOUS at 04:37

## 2019-02-05 RX ADMIN — GABAPENTIN 1 MG: 300 CAPSULE ORAL at 09:02

## 2019-02-05 RX ADMIN — PRIMIDONE 1 MG: 250 TABLET ORAL at 09:02

## 2019-02-05 RX ADMIN — METHYLPREDNISOLONE SODIUM SUCCINATE 1 MG: 40 INJECTION, POWDER, FOR SOLUTION INTRAMUSCULAR; INTRAVENOUS at 09:02

## 2019-04-29 ENCOUNTER — HOSPITAL ENCOUNTER (INPATIENT)
Dept: HOSPITAL 54 - ER | Age: 59
LOS: 4 days | Discharge: HOME | DRG: 245 | End: 2019-05-03
Attending: INTERNAL MEDICINE | Admitting: NURSE PRACTITIONER
Payer: COMMERCIAL

## 2019-04-29 VITALS — SYSTOLIC BLOOD PRESSURE: 131 MMHG | DIASTOLIC BLOOD PRESSURE: 77 MMHG

## 2019-04-29 VITALS — WEIGHT: 145.12 LBS | HEIGHT: 66 IN | BODY MASS INDEX: 23.32 KG/M2

## 2019-04-29 DIAGNOSIS — Z87.891: ICD-10-CM

## 2019-04-29 DIAGNOSIS — K56.609: ICD-10-CM

## 2019-04-29 DIAGNOSIS — E86.0: ICD-10-CM

## 2019-04-29 DIAGNOSIS — A09: ICD-10-CM

## 2019-04-29 DIAGNOSIS — G89.29: ICD-10-CM

## 2019-04-29 DIAGNOSIS — E87.1: ICD-10-CM

## 2019-04-29 DIAGNOSIS — I10: ICD-10-CM

## 2019-04-29 DIAGNOSIS — F32.9: ICD-10-CM

## 2019-04-29 DIAGNOSIS — Z88.0: ICD-10-CM

## 2019-04-29 DIAGNOSIS — F41.9: ICD-10-CM

## 2019-04-29 DIAGNOSIS — G40.909: ICD-10-CM

## 2019-04-29 DIAGNOSIS — D72.829: ICD-10-CM

## 2019-04-29 DIAGNOSIS — M25.519: ICD-10-CM

## 2019-04-29 DIAGNOSIS — K50.90: Primary | ICD-10-CM

## 2019-04-29 DIAGNOSIS — D64.9: ICD-10-CM

## 2019-04-29 LAB
ALBUMIN SERPL BCP-MCNC: 4.3 G/DL (ref 3.4–5)
ALP SERPL-CCNC: 102 U/L (ref 46–116)
ALT SERPL W P-5'-P-CCNC: 29 U/L (ref 12–78)
AST SERPL W P-5'-P-CCNC: 30 U/L (ref 15–37)
BASOPHILS # BLD AUTO: 0.1 /CMM (ref 0–0.2)
BASOPHILS NFR BLD AUTO: 0.5 % (ref 0–2)
BILIRUB DIRECT SERPL-MCNC: 0.1 MG/DL (ref 0–0.2)
BILIRUB SERPL-MCNC: 0.3 MG/DL (ref 0.2–1)
BILIRUB UR QL STRIP: (no result)
BUN SERPL-MCNC: 9 MG/DL (ref 7–18)
CALCIUM SERPL-MCNC: 9.3 MG/DL (ref 8.5–10.1)
CHLORIDE SERPL-SCNC: 98 MMOL/L (ref 98–107)
CO2 SERPL-SCNC: 27 MMOL/L (ref 21–32)
COLOR UR: (no result)
CREAT SERPL-MCNC: 1 MG/DL (ref 0.6–1.3)
EOSINOPHIL NFR BLD AUTO: 0.1 % (ref 0–6)
GLUCOSE SERPL-MCNC: 158 MG/DL (ref 74–106)
HCT VFR BLD AUTO: 40 % (ref 33–45)
HGB BLD-MCNC: 12.9 G/DL (ref 11.5–14.8)
LIPASE SERPL-CCNC: 166 U/L (ref 73–393)
LYMPHOCYTES NFR BLD AUTO: 15.9 % (ref 20–44)
LYMPHOCYTES NFR BLD AUTO: 2.1 /CMM (ref 0.8–4.8)
MCHC RBC AUTO-ENTMCNC: 33 G/DL (ref 31–36)
MCV RBC AUTO: 85 FL (ref 82–100)
MONOCYTES NFR BLD AUTO: 0.8 /CMM (ref 0.1–1.3)
MONOCYTES NFR BLD AUTO: 5.9 % (ref 2–12)
NEUTROPHILS # BLD AUTO: 10.5 /CMM (ref 1.8–8.9)
NEUTROPHILS NFR BLD AUTO: 77.6 % (ref 43–81)
PH UR STRIP: 5.5 [PH] (ref 5–8)
PLATELET # BLD AUTO: 360 /CMM (ref 150–450)
POTASSIUM SERPL-SCNC: 4 MMOL/L (ref 3.5–5.1)
PROT SERPL-MCNC: 7.8 G/DL (ref 6.4–8.2)
PROT UR QL STRIP: 30 MG/DL
RBC # BLD AUTO: 4.67 MIL/UL (ref 4–5.2)
RBC #/AREA URNS HPF: (no result) /HPF (ref 0–2)
SODIUM SERPL-SCNC: 135 MMOL/L (ref 136–145)
UROBILINOGEN UR STRIP-MCNC: 0.2 EU/DL
WBC #/AREA URNS HPF: (no result) /HPF (ref 0–3)
WBC NRBC COR # BLD AUTO: 13.5 K/UL (ref 4.3–11)

## 2019-04-29 PROCEDURE — G0378 HOSPITAL OBSERVATION PER HR: HCPCS

## 2019-04-29 RX ADMIN — HYDROMORPHONE HYDROCHLORIDE PRN MG: 1 INJECTION, SOLUTION INTRAMUSCULAR; INTRAVENOUS; SUBCUTANEOUS at 22:45

## 2019-04-29 NOTE — NUR
MS RN OPENING NOTES:



RECEIVED PT ON ROOM AIR AND IS TOLERATING WELL. NO SOB NOTED. PT STILL COMPLAINING OF PAIN 
EVEN WITH MORPHINE 4MG IV GIVEN TO HER IN ER. PT HAS IV ON R WRIST AND IS BEING BOLUSED WITH 
A LITER OF NS BAG. INFORMED PT THAT SHE IS TO BE NPO. BED KEPT IN LOW, LOCKED POSITION, AND 
SIDE RAILS X 2UP. WILL CONTINUE TO MONITOR PT.

## 2019-04-29 NOTE — NUR
MS RN NOTES:



PT COMPLAINING OF 10/10 PAIN IN HER MEDIAL ABDOMEN. PT CRYING, MOANING, AND FACIAL 
GRIMACING. PT WAS ADMINISTERED DILAUDID 1MG IV. NEW IV STARTED ON L AC #22G. R WRIST #20G 
REMOVED D/T BEING INFILTRATED.

## 2019-04-29 NOTE — NUR
MS RN NOTES:



NOTIFIED DR. REYNOSO THAT PT IS STILL IN 8/10 PAIN EVEN WITH MORPHINE 4 GIVEN IN ER. GOT ORDER 
FOR DILAUDID 1MG IV Q4 HR PRN PAIN. ALSO GOT ORDER FOR SMALL BOWEL FOLLOW THROUGH WITH PO 
CONTRAST 50/50 BARIUM AND GASTROGRAFFIN.

## 2019-04-30 VITALS — DIASTOLIC BLOOD PRESSURE: 73 MMHG | SYSTOLIC BLOOD PRESSURE: 122 MMHG

## 2019-04-30 VITALS — SYSTOLIC BLOOD PRESSURE: 113 MMHG | DIASTOLIC BLOOD PRESSURE: 73 MMHG

## 2019-04-30 VITALS — DIASTOLIC BLOOD PRESSURE: 64 MMHG | SYSTOLIC BLOOD PRESSURE: 119 MMHG

## 2019-04-30 VITALS — DIASTOLIC BLOOD PRESSURE: 67 MMHG | SYSTOLIC BLOOD PRESSURE: 103 MMHG

## 2019-04-30 VITALS — DIASTOLIC BLOOD PRESSURE: 68 MMHG | SYSTOLIC BLOOD PRESSURE: 124 MMHG

## 2019-04-30 VITALS — SYSTOLIC BLOOD PRESSURE: 122 MMHG | DIASTOLIC BLOOD PRESSURE: 60 MMHG

## 2019-04-30 LAB
BASOPHILS # BLD AUTO: 0 /CMM (ref 0–0.2)
BASOPHILS NFR BLD AUTO: 0.4 % (ref 0–2)
BUN SERPL-MCNC: 12 MG/DL (ref 7–18)
CALCIUM SERPL-MCNC: 8.6 MG/DL (ref 8.5–10.1)
CHLORIDE SERPL-SCNC: 105 MMOL/L (ref 98–107)
CHOLEST SERPL-MCNC: 209 MG/DL (ref ?–200)
CO2 SERPL-SCNC: 31 MMOL/L (ref 21–32)
CREAT SERPL-MCNC: 0.8 MG/DL (ref 0.6–1.3)
EOSINOPHIL NFR BLD AUTO: 0.5 % (ref 0–6)
GLUCOSE SERPL-MCNC: 127 MG/DL (ref 74–106)
HCT VFR BLD AUTO: 33 % (ref 33–45)
HDLC SERPL-MCNC: 97 MG/DL (ref 40–60)
HGB BLD-MCNC: 11.2 G/DL (ref 11.5–14.8)
LDLC SERPL DIRECT ASSAY-MCNC: 90 MG/DL (ref 0–99)
LYMPHOCYTES NFR BLD AUTO: 1.3 /CMM (ref 0.8–4.8)
LYMPHOCYTES NFR BLD AUTO: 22.3 % (ref 20–44)
MAGNESIUM SERPL-MCNC: 2.4 MG/DL (ref 1.8–2.4)
MCHC RBC AUTO-ENTMCNC: 34 G/DL (ref 31–36)
MCV RBC AUTO: 85 FL (ref 82–100)
MONOCYTES NFR BLD AUTO: 0.6 /CMM (ref 0.1–1.3)
MONOCYTES NFR BLD AUTO: 10.6 % (ref 2–12)
NEUTROPHILS # BLD AUTO: 3.9 /CMM (ref 1.8–8.9)
NEUTROPHILS NFR BLD AUTO: 66.2 % (ref 43–81)
PHOSPHATE SERPL-MCNC: 3.3 MG/DL (ref 2.5–4.9)
PLATELET # BLD AUTO: 276 /CMM (ref 150–450)
POTASSIUM SERPL-SCNC: 4.7 MMOL/L (ref 3.5–5.1)
RBC # BLD AUTO: 3.92 MIL/UL (ref 4–5.2)
SODIUM SERPL-SCNC: 142 MMOL/L (ref 136–145)
TRIGL SERPL-MCNC: 88 MG/DL (ref 30–150)
WBC NRBC COR # BLD AUTO: 5.8 K/UL (ref 4.3–11)

## 2019-04-30 RX ADMIN — HYDROMORPHONE HYDROCHLORIDE PRN MG: 1 INJECTION, SOLUTION INTRAMUSCULAR; INTRAVENOUS; SUBCUTANEOUS at 09:06

## 2019-04-30 RX ADMIN — HYDROMORPHONE HYDROCHLORIDE PRN MG: 1 INJECTION, SOLUTION INTRAMUSCULAR; INTRAVENOUS; SUBCUTANEOUS at 06:46

## 2019-04-30 RX ADMIN — HYDROMORPHONE HYDROCHLORIDE PRN MG: 1 INJECTION, SOLUTION INTRAMUSCULAR; INTRAVENOUS; SUBCUTANEOUS at 15:17

## 2019-04-30 RX ADMIN — PRIMIDONE SCH MG: 250 TABLET ORAL at 16:10

## 2019-04-30 RX ADMIN — HYDROMORPHONE HYDROCHLORIDE PRN MG: 1 INJECTION, SOLUTION INTRAMUSCULAR; INTRAVENOUS; SUBCUTANEOUS at 13:05

## 2019-04-30 RX ADMIN — DEXTROSE MONOHYDRATE PRN MG: 50 INJECTION, SOLUTION INTRAVENOUS at 11:27

## 2019-04-30 RX ADMIN — DEXTROSE AND SODIUM CHLORIDE PRN MLS/HR: 5; 450 INJECTION, SOLUTION INTRAVENOUS at 23:45

## 2019-04-30 RX ADMIN — PANTOPRAZOLE SODIUM SCH MG: 40 TABLET, DELAYED RELEASE ORAL at 07:30

## 2019-04-30 RX ADMIN — HYDROMORPHONE HYDROCHLORIDE PRN MG: 1 INJECTION, SOLUTION INTRAMUSCULAR; INTRAVENOUS; SUBCUTANEOUS at 02:45

## 2019-04-30 RX ADMIN — DEXTROSE AND SODIUM CHLORIDE PRN MLS/HR: 5; 450 INJECTION, SOLUTION INTRAVENOUS at 09:07

## 2019-04-30 RX ADMIN — GABAPENTIN SCH MG: 300 CAPSULE ORAL at 16:10

## 2019-04-30 RX ADMIN — HYDROMORPHONE HYDROCHLORIDE PRN MG: 1 INJECTION, SOLUTION INTRAMUSCULAR; INTRAVENOUS; SUBCUTANEOUS at 22:15

## 2019-04-30 RX ADMIN — ATORVASTATIN CALCIUM SCH MG: 10 TABLET, FILM COATED ORAL at 21:11

## 2019-04-30 RX ADMIN — HYDROMORPHONE HYDROCHLORIDE PRN MG: 1 INJECTION, SOLUTION INTRAMUSCULAR; INTRAVENOUS; SUBCUTANEOUS at 19:14

## 2019-04-30 RX ADMIN — GABAPENTIN SCH MG: 300 CAPSULE ORAL at 09:00

## 2019-04-30 RX ADMIN — Medication SCH MG: at 09:00

## 2019-04-30 RX ADMIN — GABAPENTIN SCH MG: 300 CAPSULE ORAL at 13:00

## 2019-04-30 RX ADMIN — DEXTROSE MONOHYDRATE PRN MG: 50 INJECTION, SOLUTION INTRAVENOUS at 05:31

## 2019-04-30 RX ADMIN — PRIMIDONE SCH MG: 250 TABLET ORAL at 09:00

## 2019-04-30 RX ADMIN — HYDROMORPHONE HYDROCHLORIDE PRN MG: 1 INJECTION, SOLUTION INTRAMUSCULAR; INTRAVENOUS; SUBCUTANEOUS at 11:09

## 2019-04-30 NOTE — NUR
MS RN OPENING NOTES:



RECEIVED PT ON ROOM AIR. PT TO BE ON CONT PULSE OX AND AWAITING FOR CONT PULSE OX MACHINE TO 
BE DELIVERED BY RT. PT SITTING UPRIGHT IN BED WITH NG TUBE ON RIGHT NARE. NGT  ON LOW 
INTERMITTENT SUCTION. IT IS PATENT AND DRAINING. NO SOB NOTED. DR. BRE DUMONT AT NURSING 
STATION AND RECOMMENDED THAT SHE SHOULD GET GI CONSULT. PT IN MILD DISTRESS AS SHE IS STILL 
HAVING ABDOMINAL PAIN. FAMILY MEMBER AT BEDSIDE. IV INFUSING WITH D51/2 NS AT 100ML/HR. PT 
TO BE NPO AND PT UNDERSTOOD. PT AWAKE AND A/OX4. BED KEPT IN LOW, LOCKED POSITION, AND SIDE 
RAILS X 2UP. WILL CONTINUE TO MONITOR PT. 

-------------------------------------------------------------------------------

Addendum: 05/01/19 at 0416 by RENITA DIAMOND RN

-------------------------------------------------------------------------------

IN RIGHT NARE***

## 2019-04-30 NOTE — NUR
MS RN CLOSING NOTES:



ALL NEEDS WERE ATTENDED AND ANTICIPATED FOR. IV REMAINS INTACT. PT KEPT NPO. PT FOR SMALL 
BOWEL FOLLOW THROUGH TODAY. NO SOB NOTED. BED KEPT IN LOW, LOCKED POSITION, AND SIDE RAILS X 
2UP. ENDORSED TO AM NURSE FOR ROSETTA.

## 2019-04-30 NOTE — NUR
MS RN NOTES:



PT STILL COMPLAINING OF AB PAIN 9/10. PT WAS ADMINISTERED DILAUDID 1MG IV. ALSO, PT 
VERBALIZED THAT SHE TAKES HER DRISDOL ON SUNDAY. WILL ENDORSE TO AM NURSE FOR ROSETTA.

## 2019-04-30 NOTE — NUR
RN NOTES

INFORMED DR. DE LA O PATIENT'S PAIN MANAGEMENT IS INEFFECTIVE, PATIENT IS CRYING, MOANING AND 
THRASHING. ABD PAIN "10/10". PATIENT IS CURRENTLY ON DILAUDID 1MG IV Q3HRS PRN AND NORCO 
10/325MG WHICH WAS ALSO GIVEN AND STILL NOT DUE YET AT THIS TIME. WAITING FOR DR. DE LA O'S 
ORDER, INFORMED THE PATIENT.

## 2019-04-30 NOTE — NUR
MS RN NOTES:



PT COMPLAINING OF 10/10 MEDIAL ABDOMINAL PAIN. PT CRYING AND MOANING WHILE GRASPING ONTO 
ABDOMEN. PT WAS ADMINISTERED DILAUDID 1MG IV. WILL CONTINUE TO MONITOR.

## 2019-04-30 NOTE — NUR
RN NOTES

RECEIVED ORDER FROM DR. DE LA O TO ADMINISTER ATIVAN 0.5MG X1 ONLY. SMALL BOWEL FOLLOW 
THROUGH RESULT STILL PENDING.

## 2019-04-30 NOTE — NUR
MS RN NOTES:



PT PLACED ON CONT PULSE OX. PT ALSO PLACED ON 2LPM VIA NC TO KEEP SPO2 > 92 % AS ORDERED.

## 2019-04-30 NOTE — NUR
MS RN NOTES:



PT COMPLAINING OF 10/10 ABDOMINAL PAIN. PT WAS ADMINISTERED DILAUDID 1MG IV. WILL CONTINUE 
TO MONITOR PT.

## 2019-04-30 NOTE — NUR
MS RN NOTES:



PT VERY ANXIOUS. PT WAS ADMINISTERED ONE TIME DOSE OF ATIVAN 0.5MG IV. WILL CONTINUE TO 
MONITOR.

## 2019-04-30 NOTE — NUR
RN NOTES

PATIENT A/OX4, ICE PACK PLACED ON ABDOMEN AREA TO HELP WITH PAIN. KEPT PATIENT NPO FOR SMALL 
BOWEL FOLLOW THROUGH. NO IV FLUIDS ORDERED AT THIS TIME, WILL FOLLOW UP WITH MD. KEPT 
PATIENT COMFORTABLE, CALL LIGHT WITHIN REACH, WILL CONTINUE TO MONITOR.

## 2019-04-30 NOTE — NUR
RN NOTES

INFORMED DR. DE LA O PATIENT'S DILAUDID IS INEFFECTIVE. RECEIVED VERBAL ORDER TO CHANGE THE 
DOSE TO 0.5MG Q2HR IV PRN. ORDER NOTED AND CARRIED OUT.

## 2019-04-30 NOTE — NUR
RN NOTES

PATIENT SEEN BY DR. DUMONT, RECEIVED VERBAL ORDER FOR DILAUDID 1MG X1 NOW, AND TO INSERT 
NGT. NG TUBING MEASURED FOR PLACEMENT. NGT FR18 INSERTED BY JEAN PAUL NP (DR. DUMONT'S NP) 
ON RIGHT NOSTRILS WITH 60MM MARKED ON THE TUBING PLACEMENT VERIFIED, THEN PATIENT CONNECTED 
ON LOW INTERMITTENT SUCTION, PATIENT HAD A TOTAL OF 750ML OF GASTRIC OUTPUT, IMMEDIATELY. 
PATIENT TOLERATED PROCEDURE WELL, DILAUDID 1MG X1 GIVEN TO THE PATIENT. VITALS /67 R22 
HR 99 SPO2 95% IN ROOM AIR. RECEIVED ORDER FROM DR. DUMONT, TO ORDER STAT CXR AND KUB, AND 
TO RELAY RESULT TO DR. DUMONT

## 2019-04-30 NOTE — NUR
RN NOTES

PATIENT HAS HAD 3 XRAYS DONE FOR SMALL BOWEL FOLLOW THROUGH, 2 MORE XRAYS SCHEDULED PER XRAY 
TECH, 1915 & 2115.

## 2019-04-30 NOTE — NUR
RN NOTES

INFORMED DR. DUMONT RE: CXR AND KUB RESULT. NO NEW ORDER AT THIS TIME. PATIENT NGT PATENT 
AND DRAINING, CURRENTLY ON LOW INTERMITTENT SUCTION,  AT BEDSIDE. KEPT PATIENT 
COMFORTABLE, NEEDS ATTENDED AND MET, CALL LIGHT WITHIN REACH, PATIENT VERBALIZED SHE FEELS 
BETTER, ENDORSED TO NIGHT SHIFT FOR ROSETTA.

## 2019-04-30 NOTE — NUR
RN NOTES

INFORMED DR. DE LA O PATIENT'S DILAUDID WEARS OFF LESS THAN 2 HOURS AND PATIENT IS 
COMPLAINING OF ABDOMINAL PAIN 10/10. RECEIVED ORDER FROM DR. DE LA O TO GIVE NORCO 10/325MG 
PO EVERY 6 HOURS PRN.  PER MD, OK TO GIVE NORCO BY MOUTH. PATIENT MADE AWARE.

## 2019-05-01 VITALS — DIASTOLIC BLOOD PRESSURE: 67 MMHG | SYSTOLIC BLOOD PRESSURE: 110 MMHG

## 2019-05-01 VITALS — DIASTOLIC BLOOD PRESSURE: 68 MMHG | SYSTOLIC BLOOD PRESSURE: 138 MMHG

## 2019-05-01 VITALS — DIASTOLIC BLOOD PRESSURE: 74 MMHG | SYSTOLIC BLOOD PRESSURE: 118 MMHG

## 2019-05-01 VITALS — SYSTOLIC BLOOD PRESSURE: 137 MMHG | DIASTOLIC BLOOD PRESSURE: 72 MMHG

## 2019-05-01 VITALS — SYSTOLIC BLOOD PRESSURE: 107 MMHG | DIASTOLIC BLOOD PRESSURE: 72 MMHG

## 2019-05-01 VITALS — DIASTOLIC BLOOD PRESSURE: 76 MMHG | SYSTOLIC BLOOD PRESSURE: 120 MMHG

## 2019-05-01 VITALS — SYSTOLIC BLOOD PRESSURE: 119 MMHG | DIASTOLIC BLOOD PRESSURE: 64 MMHG

## 2019-05-01 LAB
BASOPHILS # BLD AUTO: 0 /CMM (ref 0–0.2)
BASOPHILS NFR BLD AUTO: 0.4 % (ref 0–2)
BUN SERPL-MCNC: 14 MG/DL (ref 7–18)
CALCIUM SERPL-MCNC: 8.3 MG/DL (ref 8.5–10.1)
CHLORIDE SERPL-SCNC: 103 MMOL/L (ref 98–107)
CO2 SERPL-SCNC: 27 MMOL/L (ref 21–32)
CREAT SERPL-MCNC: 0.6 MG/DL (ref 0.6–1.3)
EOSINOPHIL NFR BLD AUTO: 1 % (ref 0–6)
GLUCOSE SERPL-MCNC: 110 MG/DL (ref 74–106)
HCT VFR BLD AUTO: 30 % (ref 33–45)
HEMOCCULT STL QL: NEGATIVE
HGB BLD-MCNC: 9.9 G/DL (ref 11.5–14.8)
LYMPHOCYTES NFR BLD AUTO: 1.2 /CMM (ref 0.8–4.8)
LYMPHOCYTES NFR BLD AUTO: 30.5 % (ref 20–44)
MAGNESIUM SERPL-MCNC: 2.2 MG/DL (ref 1.8–2.4)
MCHC RBC AUTO-ENTMCNC: 33 G/DL (ref 31–36)
MCV RBC AUTO: 86 FL (ref 82–100)
MONOCYTES NFR BLD AUTO: 0.6 /CMM (ref 0.1–1.3)
MONOCYTES NFR BLD AUTO: 15.9 % (ref 2–12)
NEUTROPHILS # BLD AUTO: 2.1 /CMM (ref 1.8–8.9)
NEUTROPHILS NFR BLD AUTO: 52.2 % (ref 43–81)
PHOSPHATE SERPL-MCNC: 3.1 MG/DL (ref 2.5–4.9)
PLATELET # BLD AUTO: 231 /CMM (ref 150–450)
POTASSIUM SERPL-SCNC: 4.1 MMOL/L (ref 3.5–5.1)
RBC # BLD AUTO: 3.49 MIL/UL (ref 4–5.2)
SODIUM SERPL-SCNC: 139 MMOL/L (ref 136–145)
WBC NRBC COR # BLD AUTO: 4 K/UL (ref 4.3–11)

## 2019-05-01 RX ADMIN — LORAZEPAM PRN MG: 2 INJECTION INTRAMUSCULAR; INTRAVENOUS at 21:43

## 2019-05-01 RX ADMIN — HYDROMORPHONE HYDROCHLORIDE PRN MG: 1 INJECTION, SOLUTION INTRAMUSCULAR; INTRAVENOUS; SUBCUTANEOUS at 11:20

## 2019-05-01 RX ADMIN — ATORVASTATIN CALCIUM SCH MG: 10 TABLET, FILM COATED ORAL at 21:21

## 2019-05-01 RX ADMIN — PRIMIDONE SCH MG: 250 TABLET ORAL at 09:00

## 2019-05-01 RX ADMIN — HYDROMORPHONE HYDROCHLORIDE PRN MG: 1 INJECTION, SOLUTION INTRAMUSCULAR; INTRAVENOUS; SUBCUTANEOUS at 13:46

## 2019-05-01 RX ADMIN — HYDROMORPHONE HYDROCHLORIDE PRN MG: 1 INJECTION, SOLUTION INTRAMUSCULAR; INTRAVENOUS; SUBCUTANEOUS at 18:00

## 2019-05-01 RX ADMIN — GABAPENTIN SCH MG: 300 CAPSULE ORAL at 13:00

## 2019-05-01 RX ADMIN — LORAZEPAM PRN MG: 2 INJECTION INTRAMUSCULAR; INTRAVENOUS at 14:49

## 2019-05-01 RX ADMIN — HYDROMORPHONE HYDROCHLORIDE PRN MG: 1 INJECTION, SOLUTION INTRAMUSCULAR; INTRAVENOUS; SUBCUTANEOUS at 23:25

## 2019-05-01 RX ADMIN — Medication SCH MG: at 09:00

## 2019-05-01 RX ADMIN — LORAZEPAM PRN MG: 2 INJECTION INTRAMUSCULAR; INTRAVENOUS at 01:46

## 2019-05-01 RX ADMIN — HYDROMORPHONE HYDROCHLORIDE PRN MG: 1 INJECTION, SOLUTION INTRAMUSCULAR; INTRAVENOUS; SUBCUTANEOUS at 04:32

## 2019-05-01 RX ADMIN — GABAPENTIN SCH MG: 300 CAPSULE ORAL at 17:00

## 2019-05-01 RX ADMIN — PRIMIDONE SCH MG: 250 TABLET ORAL at 17:00

## 2019-05-01 RX ADMIN — PANTOPRAZOLE SODIUM SCH MG: 40 TABLET, DELAYED RELEASE ORAL at 07:30

## 2019-05-01 RX ADMIN — HYDROMORPHONE HYDROCHLORIDE PRN MG: 1 INJECTION, SOLUTION INTRAMUSCULAR; INTRAVENOUS; SUBCUTANEOUS at 20:00

## 2019-05-01 RX ADMIN — GABAPENTIN SCH MG: 300 CAPSULE ORAL at 09:00

## 2019-05-01 RX ADMIN — HYDROMORPHONE HYDROCHLORIDE PRN MG: 1 INJECTION, SOLUTION INTRAMUSCULAR; INTRAVENOUS; SUBCUTANEOUS at 01:15

## 2019-05-01 RX ADMIN — DEXTROSE AND SODIUM CHLORIDE PRN MLS/HR: 5; 450 INJECTION, SOLUTION INTRAVENOUS at 14:19

## 2019-05-01 RX ADMIN — DEXTROSE MONOHYDRATE PRN MG: 50 INJECTION, SOLUTION INTRAVENOUS at 01:11

## 2019-05-01 NOTE — NUR
MS RN NOTES:



PT FEELING NAUSEOUS. PT WAS ADMINISTERED ZOFRAN 4MG IV. PT ALSO IN EXTREME PAIN 10/10 IN 
ABDOMEN AND IS CRYING. PT WAS ADMINISTERED DILAUDID 1MG IV. WILL CONTINUE TO MONITOR.

## 2019-05-01 NOTE — NUR
MS RN NOTES:



INFORMED NP EIPFANIO BARROS THAT PT IS REALLY REQUESTING TO SPEAK TO A DOCTOR AND SEE HER AT 
BEDSIDE. PER EPIFANIO BARROS, HE IS JUST THE ON CALL AND IT HAS TO BE THE PRIMARY/ATTENDING TO 
SEE HER. NO NEW ORDERS FOR PAIN MEDICATION. PT VERBALIZED THAT ONLY DR. DUMONT THE SURGEON 
SAW HER YESTERDAY. SHE WAS NOT SEEN BY ANY DOCTOR AND WOULD LIKE TO SEE AND SPEAK TO ONE AS 
HER PAIN IS NOT MANAGED.

## 2019-05-01 NOTE — NUR
MS RN NOTES:



PT VERY ANXIOUS THAT HER PAIN LEVEL WON'T DECREASE. PT WAS ADMINISTERED ATIVAN 1MG IV. WILL 
CONTINUE TO MONITOR PT.

## 2019-05-01 NOTE — NUR
RN TELE NOTES

RECEIVED PT FROM EVANGELISTA TEAGUE VIA BED, PT IS AWAKE, ALERT AND ORIENTED, RESPIRATIONS NORMAL, 
WITH COMPLAINT OF ABDOMINAL PAIN 7/10, NO NAUSEA OR VOMITING AT THIS TIME, WITH NGT 
CONNECTED TO LOW INTERMITTENT SUCTION, IV FLUIDS INFUSING WELL, CALL LIGHT PLACED WITHIN 
REACH, PLAN OF CARE DISCUSSED WITH PT, VERBALIZED UNDERSTANDING, KEPT WARM AND COMFORTBLE IN 
BED.

## 2019-05-01 NOTE — NUR
RN NOTES

PATIENT A/OX4, PATIENT GIVEN DILAUDID 1MG FOR PAIN, 10/10. NGT PATENT AND CONNECTED TO LOW 
INTERMITTENT SUCTION WITH GREEN GASTRIC CONTENT 100ML AT THIS TIME. PATIENT'S SPO2 DROPS 
DOWN TO 80S DUE TO PAIN. PATIENT IS ON O2 AT 2LPM VIA NC. INFORMED DR. DUMONT AND ORDERED 
TO TRANSFER PATIENT TO TELEMETRY FOR CLOSE MONITORING.  AT THIS TIME. FOLLOWED UP 
SMALL BOWEL FOLLOW THROUGH RESULT AND RADIOLOGY TECH STATED IT WILL BE READ IN 15 MINS. 
NEEDS ATTENDED, PATIENT HAD A TOTAL OF 4 BM'S SINCE LAST NIGHT. PATIENT IS ON IVF AND 
TOLERATING WELL. INFORMED NURSING SUPERVISOR AND TELE CHARGE NURSE FOR A TELE BED.

## 2019-05-01 NOTE — NUR
RN medsur closing notes

Pt is alert and oriented X4. Meme NP, GI specialist spoke with PT regarding plan of care. 
Pt verbalize understanding. PT is not on oxygen anymore. NO SOB. respiration is clear and 
unlabored. Labs ordered. Stool specimen sent to lab, received by Henny. PT still in NG 
tube. NPO. pain medications have been given for pain management. help repositioning for 
comfort. No  c/o of nausea and vomiting at this time. Pt's  at the bedside. Bed at 
low position and call light is within reach. IV fluid is running D5       1/2NS @ 100 ML/HR.

## 2019-05-01 NOTE — NUR
RN medsurg notes

Maye NP, spoke and discussed with the PT about the Plan of care, disease process, pain 
managements, and KUB x-ray. Maye BOOTH, suggested the pt to walk as tolerated and 
chloraseptic spray for sore throat. PT verbalize understanding. Bed at low position and call 
light is within reach. Will continue to monitor.

## 2019-05-01 NOTE — NUR
MS RN NOTES:



PAGED NP EPIFANIO BARROS. INFORMED HIM THAT PT HAS AN NG TUBE IN PLACE AND THAT SHE DOES NOT KNOW 
ANY LONGER HOW LONG SHE CAN TOLERATE IT FOR ALTHOUGH EXPLAINED TO PT THE IMPORTANCE OF THE 
NG TUBE. ALSO, INFORMED HIM THAT PT STILL IN 10/10 EXCRUCIATING PAIN IN HER ABDOMEN AND 
ALTHOUGH PT RECEIVED THE DILAUDID 1MG AROUND 432AM, PT IS STILL IN PAIN. NO NEW ORDERS AT 
THIS TIME.

## 2019-05-01 NOTE — NUR
RN TELE NOTES

PT IN BED, AWAKE, ALERT AND ORIENTED, PAIN MEDICATION GIVEN FOR PAIN MANAGEMENT, 
RESPIRATIONS NORMAL, NGT ON LOW INT SUCTION WITH  MINIMAL LIGHT GREENISH FLUID, PT SEEN BY 
DR. DE LA O, PLAN OF CARE DISCUSSED WITH PT, VERBALIZED UNDERSTANDING, WILL CONTINUE TO 
MONITOR.

## 2019-05-01 NOTE — NUR
RN OPENING NOTES



RECEIVED PATIENT AWAKE, RESTING COMFORTABLY IN BED. PATIENT IS A/O X 4. NO SIGNS OF 
RESPIRATORY DISTRESS. DENIES SHORTNESS OF BREATH. PATIENT DENIES PAIN AT THIS TIME. PATIENT 
HAS NG TUBE ON LOW, INTERMITTENT SUCTION. IV SITE PATENT AND INTACT. NO COMPLAINTS OF NAUSEA 
OR VOMITING AT THIS TIME. CURRENT TELE READING IS SR 96. SAFETY PRECAUTIONS IMPLEMENTED. 
CALL LIGHT WITHIN REACH. WILL CONTINUE TO MONITOR PATIENT THROUGHOUT THE SHIFT.

## 2019-05-01 NOTE — NUR
MS RN CLOSING NOTES:



ALL NEEDS WERE ATTENDED AND ANTICIPATED FOR. PT SITTING UP IN BED AT THIS TIME. NGTUBE IN R 
NARE AND ON LOW INTERMITTENT SUCTION. GASTRIC OUTPUT WAS 50ML. PT VERY UPSET THAT A 
PHYSICIAN NEVER SAW HER. PT HAS IV AND IS BEING INFUSED WITH IV D51/2 NS AT 100ML/HR. PT ON 
2LPM VIA NC AND IS ON CONT PULSE OX. BED KEPT IN LOW, LOCKED POSITION, AND SIDE RAILS X 2UP. 
PT IN HIGH BOYCE'S POSITION. ENDORSED TO AM NURSE FOR ROSETTA. 

-------------------------------------------------------------------------------

Addendum: 05/01/19 at 0730 by RENITA DIAMOND RN

-------------------------------------------------------------------------------

PT ALSO HAD A LOOSE BM THIS AM.

## 2019-05-01 NOTE — NUR
RN NOTES



PATIENT PULLED OUT NG TUBE. I OFFERED TO REINSERT A NEW NGTUBE. PATIENT STILL REFUSES. NP, 
EPIFANIO BARRSO IS AWARE. NO RECOMMENDATIONS AT THIS TIME.

## 2019-05-01 NOTE — NUR
MS RN NOTES:



PT HAD A BOWEL MOVEMENT THAT CONSISTED OF HARD BALLS AND SEMI-FORMED BROWN STOOL. PT ALSO 
COMPLAINING OF 10/10 MEDIAL ABDOMINAL PAIN. PT WAS ADMINISTERED DILAUDID 1MG IV. WILL 
CONTINUE TO MONITOR.

## 2019-05-01 NOTE — NUR
RN NOTES

REPORT GIVEN TO GEORGES TEAGUE. PATIENT TRANSFERRED TO [facility] VIA ACLS PROTOCOL. PATIENT NGT 
CLAMPED, ON O2 AT 2LPM VIA NC. PATIENT IN STABLE CONDITION, PATIENT IS STILL COMPLAINING OF 
PAIN 8/10. PATIENT PLACED ON TELE MONITOR. REPORT GIVEN TO WINSTON CHARGE NURSE.

## 2019-05-02 VITALS — DIASTOLIC BLOOD PRESSURE: 71 MMHG | SYSTOLIC BLOOD PRESSURE: 118 MMHG

## 2019-05-02 VITALS — DIASTOLIC BLOOD PRESSURE: 76 MMHG | SYSTOLIC BLOOD PRESSURE: 142 MMHG

## 2019-05-02 VITALS — DIASTOLIC BLOOD PRESSURE: 73 MMHG | SYSTOLIC BLOOD PRESSURE: 126 MMHG

## 2019-05-02 VITALS — DIASTOLIC BLOOD PRESSURE: 70 MMHG | SYSTOLIC BLOOD PRESSURE: 130 MMHG

## 2019-05-02 VITALS — SYSTOLIC BLOOD PRESSURE: 120 MMHG | DIASTOLIC BLOOD PRESSURE: 66 MMHG

## 2019-05-02 LAB
BASOPHILS # BLD AUTO: 0 /CMM (ref 0–0.2)
BASOPHILS NFR BLD AUTO: 0.3 % (ref 0–2)
BUN SERPL-MCNC: 7 MG/DL (ref 7–18)
CALCIUM SERPL-MCNC: 8.6 MG/DL (ref 8.5–10.1)
CHLORIDE SERPL-SCNC: 99 MMOL/L (ref 98–107)
CO2 SERPL-SCNC: 27 MMOL/L (ref 21–32)
CREAT SERPL-MCNC: 0.5 MG/DL (ref 0.6–1.3)
EOSINOPHIL NFR BLD AUTO: 0.7 % (ref 0–6)
GLUCOSE SERPL-MCNC: 118 MG/DL (ref 74–106)
HCT VFR BLD AUTO: 30 % (ref 33–45)
HGB BLD-MCNC: 9.8 G/DL (ref 11.5–14.8)
LYMPHOCYTES NFR BLD AUTO: 1.5 /CMM (ref 0.8–4.8)
LYMPHOCYTES NFR BLD AUTO: 21.8 % (ref 20–44)
MAGNESIUM SERPL-MCNC: 1.7 MG/DL (ref 1.8–2.4)
MCHC RBC AUTO-ENTMCNC: 33 G/DL (ref 31–36)
MCV RBC AUTO: 85 FL (ref 82–100)
MONOCYTES NFR BLD AUTO: 0.6 /CMM (ref 0.1–1.3)
MONOCYTES NFR BLD AUTO: 9.4 % (ref 2–12)
NEUTROPHILS # BLD AUTO: 4.6 /CMM (ref 1.8–8.9)
NEUTROPHILS NFR BLD AUTO: 67.8 % (ref 43–81)
PHOSPHATE SERPL-MCNC: 2.8 MG/DL (ref 2.5–4.9)
PLATELET # BLD AUTO: 223 /CMM (ref 150–450)
POTASSIUM SERPL-SCNC: 3.7 MMOL/L (ref 3.5–5.1)
RBC # BLD AUTO: 3.48 MIL/UL (ref 4–5.2)
SODIUM SERPL-SCNC: 136 MMOL/L (ref 136–145)
WBC NRBC COR # BLD AUTO: 6.8 K/UL (ref 4.3–11)

## 2019-05-02 RX ADMIN — ATORVASTATIN CALCIUM SCH MG: 10 TABLET, FILM COATED ORAL at 21:14

## 2019-05-02 RX ADMIN — DEXTROSE AND SODIUM CHLORIDE PRN MLS/HR: 5; 450 INJECTION, SOLUTION INTRAVENOUS at 10:47

## 2019-05-02 RX ADMIN — HYDROMORPHONE HYDROCHLORIDE PRN MG: 1 INJECTION, SOLUTION INTRAMUSCULAR; INTRAVENOUS; SUBCUTANEOUS at 19:40

## 2019-05-02 RX ADMIN — MAGNESIUM SULFATE IN DEXTROSE SCH MLS/HR: 10 INJECTION, SOLUTION INTRAVENOUS at 10:46

## 2019-05-02 RX ADMIN — HYDROMORPHONE HYDROCHLORIDE PRN MG: 1 INJECTION, SOLUTION INTRAMUSCULAR; INTRAVENOUS; SUBCUTANEOUS at 06:04

## 2019-05-02 RX ADMIN — PRIMIDONE SCH MG: 250 TABLET ORAL at 17:24

## 2019-05-02 RX ADMIN — GABAPENTIN SCH MG: 300 CAPSULE ORAL at 09:00

## 2019-05-02 RX ADMIN — MAGNESIUM SULFATE IN DEXTROSE SCH MLS/HR: 10 INJECTION, SOLUTION INTRAVENOUS at 11:53

## 2019-05-02 RX ADMIN — HYDROMORPHONE HYDROCHLORIDE PRN MG: 1 INJECTION, SOLUTION INTRAMUSCULAR; INTRAVENOUS; SUBCUTANEOUS at 04:04

## 2019-05-02 RX ADMIN — GABAPENTIN SCH MG: 300 CAPSULE ORAL at 13:00

## 2019-05-02 RX ADMIN — GABAPENTIN SCH MG: 300 CAPSULE ORAL at 17:24

## 2019-05-02 RX ADMIN — PANTOPRAZOLE SODIUM SCH MG: 40 TABLET, DELAYED RELEASE ORAL at 07:30

## 2019-05-02 RX ADMIN — PRIMIDONE SCH MG: 250 TABLET ORAL at 09:00

## 2019-05-02 RX ADMIN — LORAZEPAM PRN MG: 2 INJECTION INTRAMUSCULAR; INTRAVENOUS at 21:14

## 2019-05-02 RX ADMIN — LORAZEPAM PRN MG: 2 INJECTION INTRAMUSCULAR; INTRAVENOUS at 08:39

## 2019-05-02 RX ADMIN — Medication SCH MG: at 09:00

## 2019-05-02 RX ADMIN — HYDROMORPHONE HYDROCHLORIDE PRN MG: 1 INJECTION, SOLUTION INTRAMUSCULAR; INTRAVENOUS; SUBCUTANEOUS at 13:54

## 2019-05-02 NOTE — NUR
MEDr RN notes

PT is alert and oriented X4. PT states "I don't feel any pain but I need medication for my 
anxiety". Ativan has been given. PT anxiety is better now.

## 2019-05-02 NOTE — NUR
RN CLOSING NOTES



PATIENT IS AWAKE, RESTING COMFORTABLY IN BED. PATIENT HAS NO SIGNS OF RESPIRATORY DISTRESS. 
NO SIGNS OF SHORTNESS OF BREATH. NEW IV SITE PATENT AND INTACT. ALL NEEDS WERE MET. ALL 
MEDICATIONS GIVEN. LATEST TELE READING IS 90. SAFETY PRECAUTIONS IMPLEMENTED. CALL LIGHT 
WITHIN REACH. WILL ENDORSE TO AM RN.

## 2019-05-02 NOTE — NUR
RN MS NOTES

PT IN BED, AWAKE, ALERT AND ORIENTED, NO COMPLAINT OF PAIN AT THIS TIME, NO COMPLAINT OF 
NAUSEA, SEEN BY JEAN PAUL BOOTH, ORDERED TO START ON CLEAR LIQUID DIET, PT TOLERATES WELL, 
AMBULATES WITH STEADY GAIT, PM MEDS GIVEN, ALL NEEDS ATTENDED.

## 2019-05-02 NOTE — NUR
Medsurg RN notes

PT is alert and oriented X4. PT is resting in bed awake. NO complaints of pain or any 
discomfort. No SOB. IV site is intact and patent. Tele reading is 90.Respirations is equal, 
clear and unlabored. Bed at low position and call light is within reach. Will continue to 
monitor

## 2019-05-02 NOTE — NUR
RN MS OPENING NOTES 

RECEIVED BEDSIDE REPORT, PT IN BED, AWAKE ALERT ORIENTEDX4, BREATHING EVEN AND UNLABORED ON 
ROOM AIR. PT COMPLAINS OF ABD PAIN 7/10, DILAUDID ADMINISTERED AS PRESCRIBED. IV ACCESS ON 
THE R HAND 24GWITH D5 1/2 NS @100ML/HR. BED IN LOWEST LOCKED POSITION, CALL LIGHT WITHIN 
REACH AT ALL TIMES, WILL CONTINUE TO MONITOR FREQUENTLY

## 2019-05-03 VITALS — DIASTOLIC BLOOD PRESSURE: 73 MMHG | SYSTOLIC BLOOD PRESSURE: 144 MMHG

## 2019-05-03 LAB
BUN SERPL-MCNC: 4 MG/DL (ref 7–18)
C-ANCA TITR SER IF: (no result) TITER
CALCIUM SERPL-MCNC: 8.5 MG/DL (ref 8.5–10.1)
CHLORIDE SERPL-SCNC: 103 MMOL/L (ref 98–107)
CO2 SERPL-SCNC: 28 MMOL/L (ref 21–32)
CREAT SERPL-MCNC: 0.4 MG/DL (ref 0.6–1.3)
GLUCOSE SERPL-MCNC: 92 MG/DL (ref 74–106)
MAGNESIUM SERPL-MCNC: 1.8 MG/DL (ref 1.8–2.4)
MYELOPEROXIDASE AB SER IA-ACNC: <9 U/ML (ref 0–9)
P-ANCA ATYPICAL TITR SER IF: (no result) TITER
P-ANCA TITR SER IF: (no result) TITER
POTASSIUM SERPL-SCNC: 3.4 MMOL/L (ref 3.5–5.1)
PROTEINASE3 AB SER IA-ACNC: <3.5 U/ML (ref 0–3.5)
SODIUM SERPL-SCNC: 139 MMOL/L (ref 136–145)

## 2019-05-03 RX ADMIN — GABAPENTIN SCH MG: 300 CAPSULE ORAL at 08:20

## 2019-05-03 RX ADMIN — GABAPENTIN SCH MG: 300 CAPSULE ORAL at 12:05

## 2019-05-03 RX ADMIN — PRIMIDONE SCH MG: 250 TABLET ORAL at 08:20

## 2019-05-03 RX ADMIN — PANTOPRAZOLE SODIUM SCH MG: 40 TABLET, DELAYED RELEASE ORAL at 08:20

## 2019-05-03 RX ADMIN — Medication SCH MG: at 08:20

## 2019-05-03 RX ADMIN — HYDROMORPHONE HYDROCHLORIDE PRN MG: 1 INJECTION, SOLUTION INTRAMUSCULAR; INTRAVENOUS; SUBCUTANEOUS at 08:20

## 2019-05-03 NOTE — NUR
RN MS CLOSING NOTES 

PT REMAINS IN BED, SLEEPING, EASILY AROUSED TO NAME CALL, BREATHING EVEN AND UNLABORED ON 
ROOM AIR. IN NO APPARENT PAIN OR DISCOMFORT AT THIS TIME. IV ACCESS ON THE R HAND 24GWITH D5 
1/2 NS @100ML/HR. BED IN LOWEST LOCKED POSITION, CALL LIGHT WITHIN REACH AT ALL TIMES, WILL 
ENDORSE TO DAY NURSE FOR ROSETTA

## 2019-05-03 NOTE — NUR
MS/RN DISCHARGE



PATIENT DISCHARGE HOME IN STABLE CONDITION. A/O X 4. NO SIGNS OF ACUTE DISTRESS. NO COMPLAIN 
OF PAIN OR DISCOMFORT. DISCHARGE EDUCATION AND TEACHINGS PROVIDED, MADE AWARE TO FOLLOW UP 
WITH PRIMARY PHYSICIAN AND GI WITHIN A WEEK FOR OUTPATIENT EGD AND COLONOSCOPY TO RULE OUT 
[employer]. VERBALIZED UNDERSTANDING. NAME BAND AND IV LINE REMOVED. LEFT IN STABLE CONDITION VIA 
PRIVATE CAR ACCOMPANIED BY .

## 2019-05-03 NOTE — NUR
MS/RN 



PATIENT TOLERATED FULL LIQUID DIET WELL NO COMPLAIN OF PAIN OR DISCOMFORT OR ANY N/V. DIET 
ADVANCED TO SOFT DIET FOR LUNCH AS TOLERATED. WILL CONTINUE TO MONITOR FOR FURTHER CHANGES 
AND TO ENSURE SAFETY.

## 2022-01-01 ENCOUNTER — HOSPITAL ENCOUNTER (EMERGENCY)
Dept: HOSPITAL 54 - ER | Age: 62
LOS: 1 days | Discharge: ANOTHER HOSPITAL | End: 2022-01-02
Payer: COMMERCIAL

## 2022-01-01 VITALS — WEIGHT: 189 LBS | BODY MASS INDEX: 31.49 KG/M2 | HEIGHT: 65 IN

## 2022-01-01 DIAGNOSIS — Z79.82: ICD-10-CM

## 2022-01-01 DIAGNOSIS — J96.01: ICD-10-CM

## 2022-01-01 DIAGNOSIS — J18.9: ICD-10-CM

## 2022-01-01 DIAGNOSIS — R65.20: ICD-10-CM

## 2022-01-01 DIAGNOSIS — Z79.899: ICD-10-CM

## 2022-01-01 DIAGNOSIS — Z20.822: ICD-10-CM

## 2022-01-01 DIAGNOSIS — A41.9: Primary | ICD-10-CM

## 2022-01-01 DIAGNOSIS — Z88.0: ICD-10-CM

## 2022-01-01 DIAGNOSIS — I48.91: ICD-10-CM

## 2022-01-01 DIAGNOSIS — F41.9: ICD-10-CM

## 2022-01-01 DIAGNOSIS — I10: ICD-10-CM

## 2022-01-01 LAB
ALBUMIN SERPL BCP-MCNC: 3.9 G/DL (ref 3.4–5)
ALP SERPL-CCNC: 122 U/L (ref 46–116)
ALT SERPL W P-5'-P-CCNC: 28 U/L (ref 12–78)
AST SERPL W P-5'-P-CCNC: 23 U/L (ref 15–37)
BASOPHILS # BLD AUTO: 0 K/UL (ref 0–0.2)
BASOPHILS NFR BLD AUTO: 0.2 % (ref 0–2)
BILIRUB DIRECT SERPL-MCNC: 0.1 MG/DL (ref 0–0.2)
BILIRUB SERPL-MCNC: 0.3 MG/DL (ref 0.2–1)
BUN SERPL-MCNC: 13 MG/DL (ref 7–18)
CALCIUM SERPL-MCNC: 8.1 MG/DL (ref 8.5–10.1)
CHLORIDE SERPL-SCNC: 96 MMOL/L (ref 98–107)
CO2 SERPL-SCNC: 19 MMOL/L (ref 21–32)
CREAT SERPL-MCNC: 1 MG/DL (ref 0.6–1.3)
EOSINOPHIL NFR BLD AUTO: 0.1 % (ref 0–6)
GLUCOSE SERPL-MCNC: 122 MG/DL (ref 74–106)
HCT VFR BLD AUTO: 36 % (ref 33–45)
HGB BLD-MCNC: 12.1 G/DL (ref 11.5–14.8)
LYMPHOCYTES NFR BLD AUTO: 0.5 K/UL (ref 0.8–4.8)
LYMPHOCYTES NFR BLD AUTO: 3.5 % (ref 20–44)
LYMPHOCYTES NFR BLD MANUAL: 2 % (ref 16–48)
MCHC RBC AUTO-ENTMCNC: 34 G/DL (ref 31–36)
MCV RBC AUTO: 91 FL (ref 82–100)
MONOCYTES NFR BLD AUTO: 0.7 K/UL (ref 0.1–1.3)
MONOCYTES NFR BLD AUTO: 5.3 % (ref 2–12)
MONOCYTES NFR BLD MANUAL: 4 % (ref 0–11)
NEUTROPHILS # BLD AUTO: 12.4 K/UL (ref 1.8–8.9)
NEUTROPHILS NFR BLD AUTO: 90.9 % (ref 43–81)
NEUTS BAND NFR BLD MANUAL: 9 % (ref 0–5)
NEUTS SEG NFR BLD MANUAL: 85 % (ref 42–76)
PLATELET # BLD AUTO: 224 K/UL (ref 150–450)
POTASSIUM SERPL-SCNC: 4.4 MMOL/L (ref 3.5–5.1)
PROT SERPL-MCNC: 6.9 G/DL (ref 6.4–8.2)
RBC # BLD AUTO: 3.99 MIL/UL (ref 4–5.2)
SODIUM SERPL-SCNC: 129 MMOL/L (ref 136–145)
WBC NRBC COR # BLD AUTO: 13.6 K/UL (ref 4.3–11)

## 2022-01-01 PROCEDURE — U0003 INFECTIOUS AGENT DETECTION BY NUCLEIC ACID (DNA OR RNA); SEVERE ACUTE RESPIRATORY SYNDROME CORONAVIRUS 2 (SARS-COV-2) (CORONAVIRUS DISEASE [COVID-19]), AMPLIFIED PROBE TECHNIQUE, MAKING USE OF HIGH THROUGHPUT TECHNOLOGIES AS DESCRIBED BY CMS-2020-01-R: HCPCS

## 2022-01-01 PROCEDURE — C9803 HOPD COVID-19 SPEC COLLECT: HCPCS

## 2022-01-02 VITALS — SYSTOLIC BLOOD PRESSURE: 140 MMHG | DIASTOLIC BLOOD PRESSURE: 72 MMHG

## 2023-01-06 ENCOUNTER — HOSPITAL ENCOUNTER (EMERGENCY)
Dept: HOSPITAL 12 - ER | Age: 63
LOS: 1 days | Discharge: ANOTHER HOSPITAL | End: 2023-01-07
Payer: MEDICAID

## 2023-01-06 VITALS — BODY MASS INDEX: 29.57 KG/M2 | WEIGHT: 184 LBS | HEIGHT: 66 IN

## 2023-01-06 DIAGNOSIS — J96.01: Primary | ICD-10-CM

## 2023-01-06 DIAGNOSIS — E78.5: ICD-10-CM

## 2023-01-06 DIAGNOSIS — Z96.651: ICD-10-CM

## 2023-01-06 DIAGNOSIS — J44.9: ICD-10-CM

## 2023-01-06 DIAGNOSIS — Z20.822: ICD-10-CM

## 2023-01-06 DIAGNOSIS — F41.9: ICD-10-CM

## 2023-01-06 DIAGNOSIS — R79.1: ICD-10-CM

## 2023-01-06 DIAGNOSIS — M25.561: ICD-10-CM

## 2023-01-06 DIAGNOSIS — Z88.0: ICD-10-CM

## 2023-01-06 DIAGNOSIS — I25.10: ICD-10-CM

## 2023-01-06 DIAGNOSIS — R91.8: ICD-10-CM

## 2023-01-06 DIAGNOSIS — E03.9: ICD-10-CM

## 2023-01-06 LAB
ALP SERPL-CCNC: 187 U/L (ref 50–136)
ALT SERPL W/O P-5'-P-CCNC: 18 U/L (ref 14–59)
AST SERPL-CCNC: 32 U/L (ref 15–37)
BILIRUB DIRECT SERPL-MCNC: 0.1 MG/DL (ref 0–0.2)
BILIRUB SERPL-MCNC: 0.2 MG/DL (ref 0.2–1)
BUN SERPL-MCNC: 7 MG/DL (ref 7–18)
CHLORIDE SERPL-SCNC: 99 MMOL/L (ref 98–107)
CO2 SERPL-SCNC: 30 MMOL/L (ref 21–32)
CREAT SERPL-MCNC: 0.7 MG/DL (ref 0.6–1.3)
GLUCOSE SERPL-MCNC: 87 MG/DL (ref 74–106)
HCT VFR BLD AUTO: 31.2 % (ref 31.2–41.9)
MCH RBC QN AUTO: 31.2 UUG (ref 24.7–32.8)
MCV RBC AUTO: 96.5 FL (ref 75.5–95.3)
PLATELET # BLD AUTO: 267 K/UL (ref 179–408)
POTASSIUM SERPL-SCNC: 3.7 MMOL/L (ref 3.5–5.1)
WS STN SPEC: 7.5 G/DL (ref 6.4–8.2)

## 2023-01-06 PROCEDURE — 80076 HEPATIC FUNCTION PANEL: CPT

## 2023-01-06 PROCEDURE — 71275 CT ANGIOGRAPHY CHEST: CPT

## 2023-01-06 PROCEDURE — 80048 BASIC METABOLIC PNL TOTAL CA: CPT

## 2023-01-06 PROCEDURE — 96374 THER/PROPH/DIAG INJ IV PUSH: CPT

## 2023-01-06 PROCEDURE — 87040 BLOOD CULTURE FOR BACTERIA: CPT

## 2023-01-06 PROCEDURE — 85379 FIBRIN DEGRADATION QUANT: CPT

## 2023-01-06 PROCEDURE — 96361 HYDRATE IV INFUSION ADD-ON: CPT

## 2023-01-06 PROCEDURE — 85730 THROMBOPLASTIN TIME PARTIAL: CPT

## 2023-01-06 PROCEDURE — 71045 X-RAY EXAM CHEST 1 VIEW: CPT

## 2023-01-06 PROCEDURE — 84484 ASSAY OF TROPONIN QUANT: CPT

## 2023-01-06 PROCEDURE — 85025 COMPLETE CBC W/AUTO DIFF WBC: CPT

## 2023-01-06 PROCEDURE — 84145 PROCALCITONIN (PCT): CPT

## 2023-01-06 PROCEDURE — 93005 ELECTROCARDIOGRAM TRACING: CPT

## 2023-01-06 PROCEDURE — 87426 SARSCOV CORONAVIRUS AG IA: CPT

## 2023-01-06 PROCEDURE — 99285 EMERGENCY DEPT VISIT HI MDM: CPT

## 2023-01-06 PROCEDURE — 83605 ASSAY OF LACTIC ACID: CPT

## 2023-01-06 PROCEDURE — 96372 THER/PROPH/DIAG INJ SC/IM: CPT

## 2023-01-06 PROCEDURE — 36415 COLL VENOUS BLD VENIPUNCTURE: CPT

## 2023-01-06 PROCEDURE — 83880 ASSAY OF NATRIURETIC PEPTIDE: CPT

## 2023-01-06 NOTE — NUR
MACIE RA78 from home with c/o SOB, pt to room 2A via EMS [facility]. Pt states hx of 
COPD and asthma, states right knee replacement at [facility] 
on 12/14/22. Pt was given Albuterol HHN TX in route to ER. Pt arrives A/Ox3, 
states she feels much better and no acute resp distress noted at this time. 
Placed on cardiac monitor, BP and pulse ox. Room air saturation=91%, placed on 
O2 2L via NC.

## 2023-01-07 NOTE — NUR
Patient's upper extremities are tender to touch and swollen from blown out 
IV's. Dr Bowie aware. Placed hot packs.

## 2023-01-07 NOTE — NUR
Spoke to House supervisor at [facility]. Admitting doctor is Dr. Yu. Patient 
has been admitted to 5W Room 509.

## 2023-01-07 NOTE — NUR
PT WAS TRANSFERED TO [facility] VIA ALS AMBULANCE, REPORT 
WAS GIVEN TO AMBULANCE RN.

## 2023-01-07 NOTE — NUR
Patient was brought back from CT. CT tech stated patients 18G on left upper arm 
blew out. Placed hot pack on LT upper extremeties.

## 2023-01-07 NOTE — NUR
Called [facility] [phone] to give report to RN. RN stated bed is being 
cleaned and will take 1 hour for cleaning. Will call again in 1 hour.

## 2023-09-10 ENCOUNTER — HOSPITAL ENCOUNTER (EMERGENCY)
Dept: HOSPITAL 54 - ER | Age: 63
Discharge: ANOTHER HOSPITAL | End: 2023-09-10
Payer: COMMERCIAL

## 2023-09-10 VITALS — BODY MASS INDEX: 28.93 KG/M2 | HEIGHT: 66 IN | WEIGHT: 180 LBS

## 2023-09-10 VITALS — SYSTOLIC BLOOD PRESSURE: 166 MMHG | DIASTOLIC BLOOD PRESSURE: 82 MMHG | TEMPERATURE: 98 F | OXYGEN SATURATION: 96 %

## 2023-09-10 DIAGNOSIS — I50.9: ICD-10-CM

## 2023-09-10 DIAGNOSIS — I11.0: ICD-10-CM

## 2023-09-10 DIAGNOSIS — R42: Primary | ICD-10-CM

## 2023-09-10 DIAGNOSIS — F41.9: ICD-10-CM

## 2023-09-10 DIAGNOSIS — Z88.0: ICD-10-CM

## 2023-09-10 DIAGNOSIS — J44.9: ICD-10-CM

## 2023-09-10 DIAGNOSIS — Z60.2: ICD-10-CM

## 2023-09-10 DIAGNOSIS — R11.2: ICD-10-CM

## 2023-09-10 DIAGNOSIS — Z79.899: ICD-10-CM

## 2023-09-10 DIAGNOSIS — F32.A: ICD-10-CM

## 2023-09-10 LAB
ALBUMIN SERPL BCP-MCNC: 3.5 G/DL (ref 3.4–5)
ALP SERPL-CCNC: 113 U/L (ref 46–116)
ALT SERPL W P-5'-P-CCNC: 34 U/L (ref 12–78)
AST SERPL W P-5'-P-CCNC: 34 U/L (ref 15–37)
BASOPHILS # BLD AUTO: 0 K/UL (ref 0–0.2)
BASOPHILS NFR BLD AUTO: 0.2 % (ref 0–2)
BILIRUB DIRECT SERPL-MCNC: 0.1 MG/DL (ref 0–0.2)
BILIRUB SERPL-MCNC: 0.3 MG/DL (ref 0.2–1)
BUN SERPL-MCNC: 4 MG/DL (ref 7–18)
CALCIUM SERPL-MCNC: 9 MG/DL (ref 8.5–10.1)
CHLORIDE SERPL-SCNC: 104 MMOL/L (ref 98–107)
CO2 SERPL-SCNC: 28 MMOL/L (ref 21–32)
CREAT SERPL-MCNC: 0.6 MG/DL (ref 0.6–1.3)
EOSINOPHIL # BLD AUTO: 0.1 K/UL (ref 0–0.7)
EOSINOPHIL NFR BLD AUTO: 1.1 % (ref 0–6)
ERYTHROCYTE [DISTWIDTH] IN BLOOD BY AUTOMATED COUNT: 15.1 % (ref 11.5–15)
GLUCOSE SERPL-MCNC: 141 MG/DL (ref 74–106)
HCT VFR BLD AUTO: 33 % (ref 33–45)
HGB BLD-MCNC: 11 G/DL (ref 11.5–14.8)
LYMPHOCYTES NFR BLD AUTO: 1.4 K/UL (ref 0.8–4.8)
LYMPHOCYTES NFR BLD AUTO: 16.6 % (ref 20–44)
MCH RBC QN AUTO: 31 PG (ref 26–33)
MCHC RBC AUTO-ENTMCNC: 33 G/DL (ref 31–36)
MCV RBC AUTO: 92 FL (ref 82–100)
MONOCYTES NFR BLD AUTO: 0.7 K/UL (ref 0.1–1.3)
MONOCYTES NFR BLD AUTO: 8.1 % (ref 2–12)
NEUTROPHILS # BLD AUTO: 6.5 K/UL (ref 1.8–8.9)
NEUTROPHILS NFR BLD AUTO: 74 % (ref 43–81)
NT-PROBNP SERPL-MCNC: 385 PG/ML (ref 0–125)
PLATELET # BLD AUTO: 175 K/UL (ref 150–450)
POTASSIUM SERPL-SCNC: 3.6 MMOL/L (ref 3.5–5.1)
PROT SERPL-MCNC: 7.1 G/DL (ref 6.4–8.2)
RBC # BLD AUTO: 3.58 MIL/UL (ref 4–5.2)
SODIUM SERPL-SCNC: 138 MMOL/L (ref 136–145)
WBC NRBC COR # BLD AUTO: 8.7 K/UL (ref 4.3–11)

## 2023-09-10 PROCEDURE — 83880 ASSAY OF NATRIURETIC PEPTIDE: CPT

## 2023-09-10 PROCEDURE — 93005 ELECTROCARDIOGRAM TRACING: CPT

## 2023-09-10 PROCEDURE — 84484 ASSAY OF TROPONIN QUANT: CPT

## 2023-09-10 PROCEDURE — 96374 THER/PROPH/DIAG INJ IV PUSH: CPT

## 2023-09-10 PROCEDURE — 80076 HEPATIC FUNCTION PANEL: CPT

## 2023-09-10 PROCEDURE — 80048 BASIC METABOLIC PNL TOTAL CA: CPT

## 2023-09-10 PROCEDURE — 99291 CRITICAL CARE FIRST HOUR: CPT

## 2023-09-10 PROCEDURE — 36415 COLL VENOUS BLD VENIPUNCTURE: CPT

## 2023-09-10 PROCEDURE — 83690 ASSAY OF LIPASE: CPT

## 2023-09-10 PROCEDURE — 70450 CT HEAD/BRAIN W/O DYE: CPT

## 2023-09-10 PROCEDURE — 96375 TX/PRO/DX INJ NEW DRUG ADDON: CPT

## 2023-09-10 PROCEDURE — 71045 X-RAY EXAM CHEST 1 VIEW: CPT

## 2023-09-10 PROCEDURE — 85025 COMPLETE CBC W/AUTO DIFF WBC: CPT

## 2023-09-10 PROCEDURE — 96361 HYDRATE IV INFUSION ADD-ON: CPT

## 2023-09-10 SDOH — SOCIAL STABILITY - SOCIAL INSECURITY: PROBLEMS RELATED TO LIVING ALONE: Z60.2

## 2023-11-28 ENCOUNTER — HOSPITAL ENCOUNTER (EMERGENCY)
Dept: HOSPITAL 54 - ER | Age: 63
LOS: 1 days | Discharge: LEFT BEFORE DISCHARGE | End: 2023-11-29
Payer: COMMERCIAL

## 2023-11-28 VITALS — BODY MASS INDEX: 28.93 KG/M2 | WEIGHT: 180 LBS | HEIGHT: 66 IN

## 2023-11-28 VITALS — OXYGEN SATURATION: 95 %

## 2023-11-28 VITALS — OXYGEN SATURATION: 100 %

## 2023-11-28 VITALS — OXYGEN SATURATION: 97 %

## 2023-11-28 VITALS — OXYGEN SATURATION: 93 %

## 2023-11-28 DIAGNOSIS — Z79.899: ICD-10-CM

## 2023-11-28 DIAGNOSIS — J96.90: ICD-10-CM

## 2023-11-28 DIAGNOSIS — F41.9: ICD-10-CM

## 2023-11-28 DIAGNOSIS — J45.901: Primary | ICD-10-CM

## 2023-11-28 DIAGNOSIS — F32.A: ICD-10-CM

## 2023-11-28 DIAGNOSIS — Z20.822: ICD-10-CM

## 2023-11-28 DIAGNOSIS — Z60.2: ICD-10-CM

## 2023-11-28 DIAGNOSIS — F17.200: ICD-10-CM

## 2023-11-28 DIAGNOSIS — I11.0: ICD-10-CM

## 2023-11-28 DIAGNOSIS — J82.83: ICD-10-CM

## 2023-11-28 DIAGNOSIS — I50.9: ICD-10-CM

## 2023-11-28 DIAGNOSIS — J44.9: ICD-10-CM

## 2023-11-28 DIAGNOSIS — Z88.0: ICD-10-CM

## 2023-11-28 LAB
BASOPHILS # BLD AUTO: 0 K/UL (ref 0–0.2)
BASOPHILS NFR BLD AUTO: 0.5 % (ref 0–2)
BUN SERPL-MCNC: 4 MG/DL (ref 7–18)
CALCIUM SERPL-MCNC: 9.1 MG/DL (ref 8.5–10.1)
CHLORIDE SERPL-SCNC: 96 MMOL/L (ref 98–107)
CO2 SERPL-SCNC: 27 MMOL/L (ref 21–32)
CREAT SERPL-MCNC: 0.8 MG/DL (ref 0.6–1.3)
EOSINOPHIL # BLD AUTO: 0.1 K/UL (ref 0–0.7)
EOSINOPHIL NFR BLD AUTO: 1.1 % (ref 0–6)
ERYTHROCYTE [DISTWIDTH] IN BLOOD BY AUTOMATED COUNT: 14.8 % (ref 11.5–15)
GLUCOSE SERPL-MCNC: 118 MG/DL (ref 74–106)
HCT VFR BLD AUTO: 40 % (ref 33–45)
HGB BLD-MCNC: 13.1 G/DL (ref 11.5–14.8)
LYMPHOCYTES NFR BLD AUTO: 2.3 K/UL (ref 0.8–4.8)
LYMPHOCYTES NFR BLD AUTO: 43 % (ref 20–44)
MCH RBC QN AUTO: 30 PG (ref 26–33)
MCHC RBC AUTO-ENTMCNC: 33 G/DL (ref 31–36)
MCV RBC AUTO: 92 FL (ref 82–100)
MONOCYTES NFR BLD AUTO: 0.6 K/UL (ref 0.1–1.3)
MONOCYTES NFR BLD AUTO: 10.6 % (ref 2–12)
NEUTROPHILS # BLD AUTO: 2.4 K/UL (ref 1.8–8.9)
NEUTROPHILS NFR BLD AUTO: 44.8 % (ref 43–81)
NT-PROBNP SERPL-MCNC: 60 PG/ML (ref 0–125)
PLATELET # BLD AUTO: 287 K/UL (ref 150–450)
POTASSIUM SERPL-SCNC: 3.1 MMOL/L (ref 3.5–5.1)
RBC # BLD AUTO: 4.36 MIL/UL (ref 4–5.2)
SODIUM SERPL-SCNC: 131 MMOL/L (ref 136–145)
WBC NRBC COR # BLD AUTO: 5.4 K/UL (ref 4.3–11)

## 2023-11-28 PROCEDURE — 36600 WITHDRAWAL OF ARTERIAL BLOOD: CPT

## 2023-11-28 PROCEDURE — 99291 CRITICAL CARE FIRST HOUR: CPT

## 2023-11-28 PROCEDURE — 80048 BASIC METABOLIC PNL TOTAL CA: CPT

## 2023-11-28 PROCEDURE — 82803 BLOOD GASES ANY COMBINATION: CPT

## 2023-11-28 PROCEDURE — 87804 INFLUENZA ASSAY W/OPTIC: CPT

## 2023-11-28 PROCEDURE — 85025 COMPLETE CBC W/AUTO DIFF WBC: CPT

## 2023-11-28 PROCEDURE — 87426 SARSCOV CORONAVIRUS AG IA: CPT

## 2023-11-28 PROCEDURE — 83880 ASSAY OF NATRIURETIC PEPTIDE: CPT

## 2023-11-28 PROCEDURE — 72128 CT CHEST SPINE W/O DYE: CPT

## 2023-11-28 PROCEDURE — 96365 THER/PROPH/DIAG IV INF INIT: CPT

## 2023-11-28 PROCEDURE — 84484 ASSAY OF TROPONIN QUANT: CPT

## 2023-11-28 PROCEDURE — 94640 AIRWAY INHALATION TREATMENT: CPT

## 2023-11-28 PROCEDURE — 96375 TX/PRO/DX INJ NEW DRUG ADDON: CPT

## 2023-11-28 PROCEDURE — 71045 X-RAY EXAM CHEST 1 VIEW: CPT

## 2023-11-28 PROCEDURE — 93005 ELECTROCARDIOGRAM TRACING: CPT

## 2023-11-28 PROCEDURE — C9803 HOPD COVID-19 SPEC COLLECT: HCPCS

## 2023-11-28 PROCEDURE — 94799 UNLISTED PULMONARY SVC/PX: CPT

## 2023-11-28 PROCEDURE — 36415 COLL VENOUS BLD VENIPUNCTURE: CPT

## 2023-11-28 RX ADMIN — Medication PRN MG: at 22:59

## 2023-11-28 RX ADMIN — Medication PRN MG: at 23:07

## 2023-11-28 RX ADMIN — ALBUTEROL SULFATE PRN MG: 2.5 SOLUTION RESPIRATORY (INHALATION) at 23:07

## 2023-11-28 RX ADMIN — ALBUTEROL SULFATE PRN MG: 2.5 SOLUTION RESPIRATORY (INHALATION) at 22:58

## 2023-11-28 SDOH — SOCIAL STABILITY - SOCIAL INSECURITY: PROBLEMS RELATED TO LIVING ALONE: Z60.2

## 2023-11-29 VITALS — OXYGEN SATURATION: 94 %

## 2023-11-29 VITALS — DIASTOLIC BLOOD PRESSURE: 110 MMHG | OXYGEN SATURATION: 100 % | SYSTOLIC BLOOD PRESSURE: 150 MMHG

## 2023-11-29 VITALS — OXYGEN SATURATION: 100 %

## 2023-11-29 VITALS — OXYGEN SATURATION: 96 %

## 2023-11-29 LAB
BASE EXCESS BLDA CALC-SCNC: -4.5 MMOL/L
GAS PNL BLDA: 13 G/DL (ref 12–16)
INHALED O2 CONCENTRATION: 28 %
INHALED O2 FLOW RATE: 2 L/MIN (ref 0–30)
PCO2 TEMP ADJ BLDA: 39.7 MMHG (ref 35–45)
PH TEMP ADJ BLDA: 7.34 [PH] (ref 7.35–7.45)
PO2 TEMP ADJ BLDA: 76.9 MMHG (ref 75–100)
SAO2 % BLDA: 94.7 % (ref 92–98.5)
VENTILATION MODE VENT: (no result)

## 2023-11-29 RX ADMIN — Medication PRN MG: at 00:00

## 2023-11-29 RX ADMIN — ALBUTEROL SULFATE PRN MG: 2.5 SOLUTION RESPIRATORY (INHALATION) at 00:00

## 2023-11-29 RX ADMIN — ALBUTEROL SULFATE PRN MG: 2.5 SOLUTION RESPIRATORY (INHALATION) at 00:20

## 2024-06-05 ENCOUNTER — HOSPITAL ENCOUNTER (EMERGENCY)
Dept: HOSPITAL 54 - ER | Age: 64
Discharge: HOME | End: 2024-06-05
Payer: COMMERCIAL

## 2024-06-05 VITALS — WEIGHT: 178 LBS | HEIGHT: 63 IN | BODY MASS INDEX: 31.54 KG/M2

## 2024-06-05 VITALS — TEMPERATURE: 98.2 F | SYSTOLIC BLOOD PRESSURE: 112 MMHG | DIASTOLIC BLOOD PRESSURE: 73 MMHG | OXYGEN SATURATION: 98 %

## 2024-06-05 VITALS — OXYGEN SATURATION: 92 %

## 2024-06-05 VITALS — OXYGEN SATURATION: 98 %

## 2024-06-05 DIAGNOSIS — S81.011A: Primary | ICD-10-CM
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
## 2024-06-05 DIAGNOSIS — F41.9: ICD-10-CM

## 2024-06-05 DIAGNOSIS — I50.9: ICD-10-CM
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
## 2024-06-05 DIAGNOSIS — J45.901: ICD-10-CM

## 2024-06-05 DIAGNOSIS — I11.0: ICD-10-CM

## 2024-06-05 DIAGNOSIS — Z88.0: ICD-10-CM

## 2024-06-05 DIAGNOSIS — F32.A: ICD-10-CM

## 2024-06-05 DIAGNOSIS — Z60.2: ICD-10-CM
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
## 2024-06-05 DIAGNOSIS — M54.2: ICD-10-CM

## 2024-06-05 DIAGNOSIS — Z79.899: ICD-10-CM

## 2024-06-05 DIAGNOSIS — R51.9: ICD-10-CM
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
## 2024-06-05 DIAGNOSIS — J44.9: ICD-10-CM

## 2024-06-05 LAB
BASOPHILS # BLD AUTO: 0.1 K/UL (ref 0–0.2)
BASOPHILS NFR BLD AUTO: 1 % (ref 0–2)
BUN SERPL-MCNC: 14 MG/DL (ref 7–18)
CALCIUM SERPL-MCNC: 8.8 MG/DL (ref 8.5–10.1)
CHLORIDE SERPL-SCNC: 102 MMOL/L (ref 98–107)
CO2 SERPL-SCNC: 34 MMOL/L (ref 21–32)
CREAT SERPL-MCNC: 0.8 MG/DL (ref 0.6–1.3)
EOSINOPHIL # BLD AUTO: 0.1 K/UL (ref 0–0.7)
EOSINOPHIL NFR BLD AUTO: 1.7 % (ref 0–6)
ERYTHROCYTE [DISTWIDTH] IN BLOOD BY AUTOMATED COUNT: 15.2 % (ref 11.5–15)
GLUCOSE SERPL-MCNC: 109 MG/DL (ref 74–106)
HCT VFR BLD AUTO: 36 % (ref 33–45)
HGB BLD-MCNC: 11.8 G/DL (ref 11.5–14.8)
LYMPHOCYTES NFR BLD AUTO: 1.5 K/UL (ref 0.8–4.8)
LYMPHOCYTES NFR BLD AUTO: 27.8 % (ref 20–44)
MCH RBC QN AUTO: 30 PG (ref 26–33)
MCHC RBC AUTO-ENTMCNC: 33 G/DL (ref 31–36)
MCV RBC AUTO: 93 FL (ref 82–100)
MONOCYTES NFR BLD AUTO: 0.7 K/UL (ref 0.1–1.3)
MONOCYTES NFR BLD AUTO: 13 % (ref 2–12)
NEUTROPHILS # BLD AUTO: 3.1 K/UL (ref 1.8–8.9)
NEUTROPHILS NFR BLD AUTO: 56.5 % (ref 43–81)
PLATELET # BLD AUTO: 187 K/UL (ref 150–450)
POTASSIUM SERPL-SCNC: 3.9 MMOL/L (ref 3.5–5.1)
RBC # BLD AUTO: 3.89 MIL/UL (ref 4–5.2)
SODIUM SERPL-SCNC: 137 MMOL/L (ref 136–145)
WBC NRBC COR # BLD AUTO: 5.5 K/UL (ref 4.3–11)

## 2024-06-05 PROCEDURE — 70450 CT HEAD/BRAIN W/O DYE: CPT

## 2024-06-05 PROCEDURE — 93005 ELECTROCARDIOGRAM TRACING: CPT

## 2024-06-05 PROCEDURE — 94799 UNLISTED PULMONARY SVC/PX: CPT

## 2024-06-05 PROCEDURE — 85025 COMPLETE CBC W/AUTO DIFF WBC: CPT

## 2024-06-05 PROCEDURE — 12004 RPR S/N/AX/GEN/TRK7.6-12.5CM: CPT

## 2024-06-05 PROCEDURE — 96374 THER/PROPH/DIAG INJ IV PUSH: CPT

## 2024-06-05 PROCEDURE — 99285 EMERGENCY DEPT VISIT HI MDM: CPT

## 2024-06-05 PROCEDURE — 84484 ASSAY OF TROPONIN QUANT: CPT

## 2024-06-05 PROCEDURE — 94640 AIRWAY INHALATION TREATMENT: CPT

## 2024-06-05 PROCEDURE — 80048 BASIC METABOLIC PNL TOTAL CA: CPT

## 2024-06-05 PROCEDURE — 72125 CT NECK SPINE W/O DYE: CPT

## 2024-06-05 PROCEDURE — 73564 X-RAY EXAM KNEE 4 OR MORE: CPT

## 2024-06-05 PROCEDURE — 71045 X-RAY EXAM CHEST 1 VIEW: CPT

## 2024-06-05 PROCEDURE — A6403 STERILE GAUZE>16 <= 48 SQ IN: HCPCS

## 2024-06-05 RX ADMIN — Medication ONE MG: at 10:23

## 2024-06-05 RX ADMIN — LIDOCAINE HYDROCHLORIDE,EPINEPHRINE BITARTRATE ONE ML: 5; .005 INJECTION, SOLUTION INFILTRATION; PERINEURAL at 10:49

## 2024-06-05 RX ADMIN — ALBUTEROL SULFATE ONE MG: 2.5 SOLUTION RESPIRATORY (INHALATION) at 10:23

## 2024-06-05 SDOH — SOCIAL STABILITY - SOCIAL INSECURITY: PROBLEMS RELATED TO LIVING ALONE: Z60.2
